# Patient Record
Sex: MALE | Race: WHITE | Employment: FULL TIME | URBAN - METROPOLITAN AREA
[De-identification: names, ages, dates, MRNs, and addresses within clinical notes are randomized per-mention and may not be internally consistent; named-entity substitution may affect disease eponyms.]

---

## 2017-10-26 ENCOUNTER — OFFICE VISIT (OUTPATIENT)
Dept: FAMILY MEDICINE CLINIC | Age: 57
End: 2017-10-26

## 2017-10-26 VITALS
RESPIRATION RATE: 14 BRPM | HEART RATE: 95 BPM | OXYGEN SATURATION: 98 % | BODY MASS INDEX: 27.99 KG/M2 | SYSTOLIC BLOOD PRESSURE: 145 MMHG | HEIGHT: 69 IN | WEIGHT: 189 LBS | DIASTOLIC BLOOD PRESSURE: 82 MMHG

## 2017-10-26 DIAGNOSIS — L30.9 HAND DERMATITIS: Primary | ICD-10-CM

## 2017-10-26 DIAGNOSIS — M10.9 PODAGRA: ICD-10-CM

## 2017-10-26 DIAGNOSIS — I10 ESSENTIAL HYPERTENSION: ICD-10-CM

## 2017-10-26 PROCEDURE — 99213 OFFICE O/P EST LOW 20 MIN: CPT | Performed by: FAMILY MEDICINE

## 2017-10-26 RX ORDER — LISINOPRIL AND HYDROCHLOROTHIAZIDE 20; 12.5 MG/1; MG/1
TABLET ORAL
Qty: 180 TABLET | Refills: 3 | Status: SHIPPED | OUTPATIENT
Start: 2017-10-26 | End: 2018-10-02 | Stop reason: SDUPTHER

## 2017-10-26 ASSESSMENT — PATIENT HEALTH QUESTIONNAIRE - PHQ9
SUM OF ALL RESPONSES TO PHQ9 QUESTIONS 1 & 2: 0
1. LITTLE INTEREST OR PLEASURE IN DOING THINGS: 0
2. FEELING DOWN, DEPRESSED OR HOPELESS: 0
SUM OF ALL RESPONSES TO PHQ QUESTIONS 1-9: 0

## 2017-10-26 NOTE — PATIENT INSTRUCTIONS
Patient Education        Learning About High Blood Pressure  What is high blood pressure? Blood pressure is a measure of how hard the blood pushes against the walls of your arteries. It's normal for blood pressure to go up and down throughout the day, but if it stays up, you have high blood pressure. Another name for high blood pressure is hypertension. Two numbers tell you your blood pressure. The first number is the systolic pressure. It shows how hard the blood pushes when your heart is pumping. The second number is the diastolic pressure. It shows how hard the blood pushes between heartbeats, when your heart is relaxed and filling with blood. A blood pressure of less than 120/80 (say \"120 over 80\") is ideal for an adult. High blood pressure is 140/90 or higher. You have high blood pressure if your top number is 140 or higher or your bottom number is 90 or higher, or both. Many people fall into the category in between, called prehypertension. People with prehypertension need to make lifestyle changes to bring their blood pressure down and help prevent or delay high blood pressure. What happens when you have high blood pressure? · Blood flows through your arteries with too much force. Over time, this damages the walls of your arteries. But you can't feel it. High blood pressure usually doesn't cause symptoms. · Fat and calcium start to build up in your arteries. This buildup is called plaque. Plaque makes your arteries narrower and stiffer. Blood can't flow through them as easily. · This lack of good blood flow starts to damage some of the organs in your body. This can lead to problems such as coronary artery disease and heart attack, heart failure, stroke, kidney failure, and eye damage. How can you prevent high blood pressure? · Stay at a healthy weight. · Try to limit how much sodium you eat to less than 2,300 milligrams (mg) a day.  If you limit your sodium to 1,500 mg a day, you can lower your blood pressure even more. ¨ Buy foods that are labeled \"unsalted,\" \"sodium-free,\" or \"low-sodium. \" Foods labeled \"reduced-sodium\" and \"light sodium\" may still have too much sodium. ¨ Flavor your food with garlic, lemon juice, onion, vinegar, herbs, and spices instead of salt. Do not use soy sauce, steak sauce, onion salt, garlic salt, mustard, or ketchup on your food. ¨ Use less salt (or none) when recipes call for it. You can often use half the salt a recipe calls for without losing flavor. · Be physically active. Get at least 30 minutes of exercise on most days of the week. Walking is a good choice. You also may want to do other activities, such as running, swimming, cycling, or playing tennis or team sports. · Limit alcohol to 2 drinks a day for men and 1 drink a day for women. · Eat plenty of fruits, vegetables, and low-fat dairy products. Eat less saturated and total fats. How is high blood pressure treated? · Your doctor will suggest making lifestyle changes. For example, your doctor may ask you to eat healthy foods, quit smoking, lose extra weight, and be more active. · If lifestyle changes don't help enough or your blood pressure is very high, you will have to take medicine every day. Follow-up care is a key part of your treatment and safety. Be sure to make and go to all appointments, and call your doctor if you are having problems. It's also a good idea to know your test results and keep a list of the medicines you take. Where can you learn more? Go to [email or web address] and sign in to your [employer] account. Enter P501 in the WeMonitor box to learn more about \"Learning About High Blood Pressure. \"     If you do not have an account, please click on the \"Sign Up Now\" link. Current as of: March 23, 2016  Content Version: 11.3  © 6078-0882 [employer], [employer]. Care instructions adapted under license by [facility] ([facility]).  If you have questions about a medical condition or this instruction, always ask your healthcare professional. [place] [address] any warranty or liability for your use of this information.

## 2017-10-26 NOTE — PROGRESS NOTES
as vesicular papules that are now open and peeling     Assessment:      Hypertension,-Probably well controlled with some white coat syndrome today. Evidence of target organ damage: none.    hand dermatitis - not responsive to mycolog  Plan:      Dietary sodium restriction. Regular aerobic exercise. Check blood pressures weekly and record.     nonwt bearing exercise  Refer to pod-Advil and heat   stop Lotrisone start triamcinolone ointment   Ref to derm when necessary  Declines flu shot  Watch blood pressure home, return to office in 6 months, labs at that time

## 2018-10-02 ENCOUNTER — OFFICE VISIT (OUTPATIENT)
Dept: FAMILY MEDICINE CLINIC | Age: 58
End: 2018-10-02
Payer: COMMERCIAL

## 2018-10-02 VITALS
DIASTOLIC BLOOD PRESSURE: 80 MMHG | WEIGHT: 190 LBS | SYSTOLIC BLOOD PRESSURE: 144 MMHG | BODY MASS INDEX: 28.14 KG/M2 | HEART RATE: 75 BPM | HEIGHT: 69 IN | OXYGEN SATURATION: 98 %

## 2018-10-02 DIAGNOSIS — I10 ESSENTIAL HYPERTENSION: ICD-10-CM

## 2018-10-02 DIAGNOSIS — M10.9 PODAGRA: Primary | ICD-10-CM

## 2018-10-02 DIAGNOSIS — L30.9 DERMATITIS: ICD-10-CM

## 2018-10-02 PROCEDURE — 99213 OFFICE O/P EST LOW 20 MIN: CPT | Performed by: FAMILY MEDICINE

## 2018-10-02 RX ORDER — LISINOPRIL AND HYDROCHLOROTHIAZIDE 20; 12.5 MG/1; MG/1
TABLET ORAL
Qty: 180 TABLET | Refills: 3 | Status: SHIPPED | OUTPATIENT
Start: 2018-10-02 | End: 2019-10-15 | Stop reason: SDUPTHER

## 2018-10-02 ASSESSMENT — PATIENT HEALTH QUESTIONNAIRE - PHQ9
SUM OF ALL RESPONSES TO PHQ QUESTIONS 1-9: 0
1. LITTLE INTEREST OR PLEASURE IN DOING THINGS: 0
SUM OF ALL RESPONSES TO PHQ QUESTIONS 1-9: 0
2. FEELING DOWN, DEPRESSED OR HOPELESS: 0
SUM OF ALL RESPONSES TO PHQ9 QUESTIONS 1 & 2: 0

## 2018-12-19 ENCOUNTER — OFFICE VISIT (OUTPATIENT)
Dept: FAMILY MEDICINE CLINIC | Age: 58
End: 2018-12-19
Payer: COMMERCIAL

## 2018-12-19 VITALS
BODY MASS INDEX: 28.21 KG/M2 | DIASTOLIC BLOOD PRESSURE: 88 MMHG | SYSTOLIC BLOOD PRESSURE: 146 MMHG | OXYGEN SATURATION: 98 % | TEMPERATURE: 98.3 F | WEIGHT: 191 LBS | HEART RATE: 77 BPM

## 2018-12-19 DIAGNOSIS — L03.115 CELLULITIS OF RIGHT LOWER EXTREMITY: Primary | ICD-10-CM

## 2018-12-19 DIAGNOSIS — I10 ESSENTIAL HYPERTENSION: ICD-10-CM

## 2018-12-19 PROCEDURE — 99213 OFFICE O/P EST LOW 20 MIN: CPT | Performed by: NURSE PRACTITIONER

## 2018-12-19 RX ORDER — SULFAMETHOXAZOLE AND TRIMETHOPRIM 800; 160 MG/1; MG/1
1 TABLET ORAL 2 TIMES DAILY
Qty: 20 TABLET | Refills: 0 | Status: SHIPPED | OUTPATIENT
Start: 2018-12-19 | End: 2018-12-27 | Stop reason: SDUPTHER

## 2018-12-19 NOTE — PROGRESS NOTES
well-nourished. Cardiovascular: Normal rate, regular rhythm and normal heart sounds. No murmur heard. Pulses:       Radial pulses are 2+ on the right side, and 2+ on the left side. No lower extremity edema noted. Pulmonary/Chest: Effort normal and breath sounds normal.   Neurological: He is alert and oriented to person, place, and time. Skin: Skin is warm and dry. There is erythema. Right lower leg: erythematous, swollen, warm and TTP. Serosanguinous drainage present     Vitals:    12/19/18 1043 12/19/18 1108   BP: (!) 160/96 (!) 146/88   Pulse: 77    Temp: 98.3 °F (36.8 °C)    SpO2: 98%    Weight: 191 lb (86.6 kg)              ASSESSMENT:  1. Cellulitis of right lower extremity    2. Essential hypertension        PLAN:  1. Cellulitis of right lower extremity  -     sulfamethoxazole-trimethoprim (BACTRIM DS;SEPTRA DS) 800-160 MG per tablet; Take 1 tablet by mouth 2 times daily for 10 days  -     mupirocin (BACTROBAN) 2 % ointment; Apply 2 times daily. Elevate when possible  Warm compresses  Wound clinic if no improvement, pt declining referral today    2. Essential hypertension  Stable- declined medication adjustment    See pt instructions  F/u 5-7 days if no improvement, sooner if symptoms worsen. Discussed use, benefit, and side effects of prescribed medications. All patient questions answered. Pt voiced understanding.

## 2018-12-27 DIAGNOSIS — L03.115 CELLULITIS OF RIGHT LOWER EXTREMITY: ICD-10-CM

## 2018-12-27 RX ORDER — SULFAMETHOXAZOLE AND TRIMETHOPRIM 800; 160 MG/1; MG/1
1 TABLET ORAL 2 TIMES DAILY
Qty: 20 TABLET | Refills: 0 | Status: SHIPPED | OUTPATIENT
Start: 2018-12-27 | End: 2019-01-06

## 2019-01-10 ENCOUNTER — OFFICE VISIT (OUTPATIENT)
Dept: FAMILY MEDICINE CLINIC | Age: 59
End: 2019-01-10
Payer: COMMERCIAL

## 2019-01-10 VITALS
WEIGHT: 189 LBS | DIASTOLIC BLOOD PRESSURE: 70 MMHG | SYSTOLIC BLOOD PRESSURE: 136 MMHG | HEIGHT: 69 IN | BODY MASS INDEX: 27.99 KG/M2 | HEART RATE: 77 BPM | OXYGEN SATURATION: 95 %

## 2019-01-10 DIAGNOSIS — L03.115 CELLULITIS OF RIGHT LOWER EXTREMITY: Primary | ICD-10-CM

## 2019-01-10 DIAGNOSIS — I10 ESSENTIAL HYPERTENSION: ICD-10-CM

## 2019-01-10 PROCEDURE — 99213 OFFICE O/P EST LOW 20 MIN: CPT | Performed by: FAMILY MEDICINE

## 2019-01-10 RX ORDER — CEPHALEXIN 500 MG/1
500 CAPSULE ORAL 3 TIMES DAILY
Qty: 30 CAPSULE | Refills: 0 | Status: SHIPPED | OUTPATIENT
Start: 2019-01-10 | End: 2019-01-24 | Stop reason: ALTCHOICE

## 2019-01-10 ASSESSMENT — PATIENT HEALTH QUESTIONNAIRE - PHQ9
1. LITTLE INTEREST OR PLEASURE IN DOING THINGS: 0
SUM OF ALL RESPONSES TO PHQ QUESTIONS 1-9: 0
SUM OF ALL RESPONSES TO PHQ QUESTIONS 1-9: 0
SUM OF ALL RESPONSES TO PHQ9 QUESTIONS 1 & 2: 0
2. FEELING DOWN, DEPRESSED OR HOPELESS: 0

## 2019-01-23 ENCOUNTER — OFFICE VISIT (OUTPATIENT)
Dept: FAMILY MEDICINE CLINIC | Age: 59
End: 2019-01-23
Payer: COMMERCIAL

## 2019-01-23 VITALS
OXYGEN SATURATION: 97 % | BODY MASS INDEX: 28.06 KG/M2 | HEART RATE: 87 BPM | SYSTOLIC BLOOD PRESSURE: 135 MMHG | DIASTOLIC BLOOD PRESSURE: 85 MMHG | WEIGHT: 190 LBS | TEMPERATURE: 97.7 F

## 2019-01-23 DIAGNOSIS — I10 ESSENTIAL HYPERTENSION: ICD-10-CM

## 2019-01-23 DIAGNOSIS — L30.9 DERMATITIS: Primary | ICD-10-CM

## 2019-01-23 PROCEDURE — 99213 OFFICE O/P EST LOW 20 MIN: CPT | Performed by: FAMILY MEDICINE

## 2019-01-24 ENCOUNTER — OFFICE VISIT (OUTPATIENT)
Dept: DERMATOLOGY | Age: 59
End: 2019-01-24
Payer: COMMERCIAL

## 2019-01-24 DIAGNOSIS — L57.8 DIFFUSE PHOTODAMAGE OF SKIN: ICD-10-CM

## 2019-01-24 DIAGNOSIS — T14.8XXA NON-HEALING NON-SURGICAL WOUND: Primary | ICD-10-CM

## 2019-01-24 PROCEDURE — 99242 OFF/OP CONSLTJ NEW/EST SF 20: CPT | Performed by: DERMATOLOGY

## 2019-01-25 ENCOUNTER — TELEPHONE (OUTPATIENT)
Dept: FAMILY MEDICINE CLINIC | Age: 59
End: 2019-01-25

## 2019-01-25 ENCOUNTER — TELEPHONE (OUTPATIENT)
Dept: DERMATOLOGY | Age: 59
End: 2019-01-25

## 2019-01-25 RX ORDER — CEPHALEXIN 500 MG/1
500 CAPSULE ORAL 3 TIMES DAILY
Qty: 30 CAPSULE | Refills: 0 | Status: SHIPPED | OUTPATIENT
Start: 2019-01-25 | End: 2019-09-20

## 2019-02-06 ENCOUNTER — OFFICE VISIT (OUTPATIENT)
Dept: DERMATOLOGY | Age: 59
End: 2019-02-06
Payer: COMMERCIAL

## 2019-02-06 DIAGNOSIS — L23.3 ALLERGIC CONTACT DERMATITIS DUE TO DRUGS IN CONTACT WITH SKIN: Primary | ICD-10-CM

## 2019-02-06 DIAGNOSIS — L82.1 SEBORRHEIC KERATOSES: ICD-10-CM

## 2019-02-06 PROCEDURE — 99213 OFFICE O/P EST LOW 20 MIN: CPT | Performed by: DERMATOLOGY

## 2019-05-08 ENCOUNTER — OFFICE VISIT (OUTPATIENT)
Dept: DERMATOLOGY | Age: 59
End: 2019-05-08
Payer: COMMERCIAL

## 2019-05-08 DIAGNOSIS — L57.8 DIFFUSE PHOTODAMAGE OF SKIN: ICD-10-CM

## 2019-05-08 DIAGNOSIS — L57.0 KERATOSIS, ACTINIC: Primary | ICD-10-CM

## 2019-05-08 DIAGNOSIS — I87.2 VENOUS STASIS DERMATITIS OF RIGHT LOWER EXTREMITY: ICD-10-CM

## 2019-05-08 DIAGNOSIS — D22.9 BENIGN NEVUS: ICD-10-CM

## 2019-05-08 PROCEDURE — 99214 OFFICE O/P EST MOD 30 MIN: CPT | Performed by: DERMATOLOGY

## 2019-05-08 PROCEDURE — 17003 DESTRUCT PREMALG LES 2-14: CPT | Performed by: DERMATOLOGY

## 2019-05-08 PROCEDURE — 17000 DESTRUCT PREMALG LESION: CPT | Performed by: DERMATOLOGY

## 2019-08-29 ENCOUNTER — TELEPHONE (OUTPATIENT)
Dept: FAMILY MEDICINE CLINIC | Age: 59
End: 2019-08-29

## 2019-08-29 DIAGNOSIS — H92.09 OTALGIA, UNSPECIFIED LATERALITY: Primary | ICD-10-CM

## 2019-09-20 ENCOUNTER — OFFICE VISIT (OUTPATIENT)
Dept: ENT CLINIC | Age: 59
End: 2019-09-20
Payer: COMMERCIAL

## 2019-09-20 VITALS
TEMPERATURE: 97.1 F | SYSTOLIC BLOOD PRESSURE: 153 MMHG | HEART RATE: 81 BPM | HEIGHT: 69 IN | DIASTOLIC BLOOD PRESSURE: 97 MMHG | WEIGHT: 190 LBS | BODY MASS INDEX: 28.14 KG/M2

## 2019-09-20 DIAGNOSIS — H61.23 BILATERAL IMPACTED CERUMEN: Primary | ICD-10-CM

## 2019-09-20 PROCEDURE — 69210 REMOVE IMPACTED EAR WAX UNI: CPT | Performed by: OTOLARYNGOLOGY

## 2019-09-20 PROCEDURE — 99242 OFF/OP CONSLTJ NEW/EST SF 20: CPT | Performed by: OTOLARYNGOLOGY

## 2019-09-20 PROCEDURE — G8427 DOCREV CUR MEDS BY ELIG CLIN: HCPCS | Performed by: OTOLARYNGOLOGY

## 2019-09-20 PROCEDURE — G8419 CALC BMI OUT NRM PARAM NOF/U: HCPCS | Performed by: OTOLARYNGOLOGY

## 2019-09-20 ASSESSMENT — ENCOUNTER SYMPTOMS
EYE ITCHING: 0
SHORTNESS OF BREATH: 0
TROUBLE SWALLOWING: 0
EYE DISCHARGE: 0
COUGH: 0
VOMITING: 0
SINUS PRESSURE: 0
DIARRHEA: 0
SINUS PAIN: 0
BLOOD IN STOOL: 0
VOICE CHANGE: 0
WHEEZING: 0
CHOKING: 0
NAUSEA: 0
RHINORRHEA: 0
FACIAL SWELLING: 0
COLOR CHANGE: 0
STRIDOR: 0
CONSTIPATION: 0
PHOTOPHOBIA: 0
SORE THROAT: 0
BACK PAIN: 0

## 2019-10-10 ENCOUNTER — TELEPHONE (OUTPATIENT)
Dept: FAMILY MEDICINE CLINIC | Age: 59
End: 2019-10-10

## 2019-10-15 ENCOUNTER — TELEPHONE (OUTPATIENT)
Dept: FAMILY MEDICINE CLINIC | Age: 59
End: 2019-10-15

## 2019-10-15 DIAGNOSIS — I10 ESSENTIAL HYPERTENSION: ICD-10-CM

## 2019-10-15 RX ORDER — LISINOPRIL AND HYDROCHLOROTHIAZIDE 20; 12.5 MG/1; MG/1
TABLET ORAL
Qty: 30 TABLET | Refills: 0 | Status: SHIPPED | OUTPATIENT
Start: 2019-10-15 | End: 2019-10-15 | Stop reason: SDUPTHER

## 2019-10-15 RX ORDER — LISINOPRIL AND HYDROCHLOROTHIAZIDE 20; 12.5 MG/1; MG/1
TABLET ORAL
Qty: 135 TABLET | Refills: 3 | Status: SHIPPED | OUTPATIENT
Start: 2019-10-15 | End: 2019-12-18 | Stop reason: SDUPTHER

## 2019-10-21 LAB
ALBUMIN SERPL-MCNC: 2.9 G/DL
ALP BLD-CCNC: 68 U/L
ALT SERPL-CCNC: 20 U/L
ANION GAP SERPL CALCULATED.3IONS-SCNC: NORMAL MMOL/L
AST SERPL-CCNC: 20 U/L
BASOPHILS ABSOLUTE: ABNORMAL /ΜL
BASOPHILS RELATIVE PERCENT: 0.11 %
BILIRUB SERPL-MCNC: 0.8 MG/DL (ref 0.1–1.4)
BUN BLDV-MCNC: 12 MG/DL
CALCIUM SERPL-MCNC: 10 MG/DL
CHLORIDE BLD-SCNC: 98 MMOL/L
CO2: 28 MMOL/L
CREAT SERPL-MCNC: 1.1 MG/DL
EOSINOPHILS ABSOLUTE: ABNORMAL /ΜL
EOSINOPHILS RELATIVE PERCENT: 0.25 %
GFR CALCULATED: NORMAL
GLUCOSE BLD-MCNC: 109 MG/DL
HCT VFR BLD CALC: 38.6 % (ref 41–53)
HEMOGLOBIN: 12.9 G/DL (ref 13.5–17.5)
LYMPHOCYTES ABSOLUTE: ABNORMAL /ΜL
LYMPHOCYTES RELATIVE PERCENT: 1.34 %
MCH RBC QN AUTO: 30.1 PG
MCHC RBC AUTO-ENTMCNC: 33.5 G/DL
MCV RBC AUTO: 89.8 FL
MONOCYTES ABSOLUTE: ABNORMAL /ΜL
MONOCYTES RELATIVE PERCENT: 0.97 %
NEUTROPHILS ABSOLUTE: ABNORMAL /ΜL
NEUTROPHILS RELATIVE PERCENT: 15.9 %
PLATELET # BLD: 452 K/ΜL
PMV BLD AUTO: ABNORMAL FL
POTASSIUM SERPL-SCNC: 4 MMOL/L
RBC # BLD: 4.3 10^6/ΜL
SODIUM BLD-SCNC: 135 MMOL/L
TOTAL PROTEIN: 6.6
WBC # BLD: 18.5 10^3/ML

## 2019-10-23 ENCOUNTER — TELEPHONE (OUTPATIENT)
Dept: FAMILY MEDICINE CLINIC | Age: 59
End: 2019-10-23

## 2019-12-18 ENCOUNTER — OFFICE VISIT (OUTPATIENT)
Dept: FAMILY MEDICINE CLINIC | Age: 59
End: 2019-12-18
Payer: COMMERCIAL

## 2019-12-18 VITALS
BODY MASS INDEX: 26.51 KG/M2 | HEIGHT: 69 IN | HEART RATE: 96 BPM | OXYGEN SATURATION: 98 % | SYSTOLIC BLOOD PRESSURE: 145 MMHG | DIASTOLIC BLOOD PRESSURE: 88 MMHG | WEIGHT: 179 LBS | TEMPERATURE: 98.9 F

## 2019-12-18 DIAGNOSIS — E78.2 MIXED HYPERLIPIDEMIA: ICD-10-CM

## 2019-12-18 DIAGNOSIS — I10 ESSENTIAL HYPERTENSION: Primary | ICD-10-CM

## 2019-12-18 DIAGNOSIS — C41.9 OSTEOSARCOMA (HCC): ICD-10-CM

## 2019-12-18 DIAGNOSIS — E79.0 HYPERURICEMIA: ICD-10-CM

## 2019-12-18 PROBLEM — E78.5 HYPERLIPIDEMIA: Status: ACTIVE | Noted: 2019-12-18

## 2019-12-18 PROCEDURE — G8419 CALC BMI OUT NRM PARAM NOF/U: HCPCS | Performed by: FAMILY MEDICINE

## 2019-12-18 PROCEDURE — G8427 DOCREV CUR MEDS BY ELIG CLIN: HCPCS | Performed by: FAMILY MEDICINE

## 2019-12-18 PROCEDURE — 99214 OFFICE O/P EST MOD 30 MIN: CPT | Performed by: FAMILY MEDICINE

## 2019-12-18 PROCEDURE — 3017F COLORECTAL CA SCREEN DOC REV: CPT | Performed by: FAMILY MEDICINE

## 2019-12-18 PROCEDURE — 1036F TOBACCO NON-USER: CPT | Performed by: FAMILY MEDICINE

## 2019-12-18 PROCEDURE — G8484 FLU IMMUNIZE NO ADMIN: HCPCS | Performed by: FAMILY MEDICINE

## 2019-12-18 RX ORDER — GABAPENTIN 300 MG/1
600 CAPSULE ORAL 3 TIMES DAILY
COMMUNITY
End: 2020-09-15

## 2019-12-18 RX ORDER — LISINOPRIL AND HYDROCHLOROTHIAZIDE 20; 12.5 MG/1; MG/1
TABLET ORAL
Qty: 135 TABLET | Refills: 3 | Status: ON HOLD
Start: 2019-12-18 | End: 2020-02-21 | Stop reason: HOSPADM

## 2019-12-18 RX ORDER — CEFDINIR 300 MG/1
300 CAPSULE ORAL 2 TIMES DAILY
COMMUNITY
End: 2020-02-13

## 2020-01-23 ENCOUNTER — OFFICE VISIT (OUTPATIENT)
Dept: FAMILY MEDICINE CLINIC | Age: 60
End: 2020-01-23
Payer: COMMERCIAL

## 2020-01-23 VITALS
HEART RATE: 102 BPM | BODY MASS INDEX: 27.25 KG/M2 | SYSTOLIC BLOOD PRESSURE: 110 MMHG | TEMPERATURE: 98.7 F | DIASTOLIC BLOOD PRESSURE: 70 MMHG | OXYGEN SATURATION: 95 % | HEIGHT: 69 IN | WEIGHT: 184 LBS

## 2020-01-23 LAB
INFLUENZA A ANTIBODY: NORMAL
INFLUENZA B ANTIBODY: NORMAL

## 2020-01-23 PROCEDURE — 3017F COLORECTAL CA SCREEN DOC REV: CPT | Performed by: FAMILY MEDICINE

## 2020-01-23 PROCEDURE — G8427 DOCREV CUR MEDS BY ELIG CLIN: HCPCS | Performed by: FAMILY MEDICINE

## 2020-01-23 PROCEDURE — 1036F TOBACCO NON-USER: CPT | Performed by: FAMILY MEDICINE

## 2020-01-23 PROCEDURE — 87804 INFLUENZA ASSAY W/OPTIC: CPT | Performed by: FAMILY MEDICINE

## 2020-01-23 PROCEDURE — G8419 CALC BMI OUT NRM PARAM NOF/U: HCPCS | Performed by: FAMILY MEDICINE

## 2020-01-23 PROCEDURE — G8484 FLU IMMUNIZE NO ADMIN: HCPCS | Performed by: FAMILY MEDICINE

## 2020-01-23 PROCEDURE — 99213 OFFICE O/P EST LOW 20 MIN: CPT | Performed by: FAMILY MEDICINE

## 2020-01-23 ASSESSMENT — PATIENT HEALTH QUESTIONNAIRE - PHQ9
1. LITTLE INTEREST OR PLEASURE IN DOING THINGS: 0
SUM OF ALL RESPONSES TO PHQ QUESTIONS 1-9: 0
2. FEELING DOWN, DEPRESSED OR HOPELESS: 0
SUM OF ALL RESPONSES TO PHQ9 QUESTIONS 1 & 2: 0
SUM OF ALL RESPONSES TO PHQ QUESTIONS 1-9: 0

## 2020-02-12 ENCOUNTER — TELEPHONE (OUTPATIENT)
Dept: FAMILY MEDICINE CLINIC | Age: 60
End: 2020-02-12

## 2020-02-12 NOTE — TELEPHONE ENCOUNTER
Pt needs pre op, no avail apptd with mahin or [facility]. See below sx info.  Thanks       R leg below knee amputation, 2/19, dr. Ankit May @ [facility]

## 2020-02-13 RX ORDER — DOXYCYCLINE HYCLATE 100 MG
100 TABLET ORAL 2 TIMES DAILY
Status: ON HOLD | COMMUNITY
End: 2020-02-21 | Stop reason: HOSPADM

## 2020-02-13 NOTE — PROGRESS NOTES
Obstructive Sleep Apnea (LES) Screening     Patient:  Rozanna Leyden    YOB: 1960      Medical Record #:  [id number]                     Date:  2/13/2020     1. Are you a loud and/or regular snorer? [x]  Yes       [] No    2. Have you been observed to gasp or stop breathing during sleep? [x]  Yes       [] No    3. Do you feel tired or groggy upon awakening or do you awaken with a headache?           []  Yes       [x] No    4. Are you often tired or fatigued during the wake time hours? [x]  Yes       [] No    5. Do you fall asleep sitting, reading, watching TV or driving? []  Yes       [x] No    6. Do you often have problems with memory or concentration? []  Yes       [x] No    **If patient's score is ? 3 they are considered high risk for LES. Notify the anesthesiologist of the high risk and document in focus note. Note:  If the patient's BMI is more than 35 kg m¯² , has neck circumference > 40 cm, and/or high blood pressure the risk is greater (© American Sleep Apnea Association, 2006).

## 2020-02-17 ENCOUNTER — OFFICE VISIT (OUTPATIENT)
Dept: FAMILY MEDICINE CLINIC | Age: 60
End: 2020-02-17
Payer: COMMERCIAL

## 2020-02-17 VITALS
HEART RATE: 97 BPM | WEIGHT: 186 LBS | HEIGHT: 69 IN | BODY MASS INDEX: 27.55 KG/M2 | SYSTOLIC BLOOD PRESSURE: 155 MMHG | TEMPERATURE: 96 F | DIASTOLIC BLOOD PRESSURE: 95 MMHG | OXYGEN SATURATION: 97 %

## 2020-02-17 PROCEDURE — 1036F TOBACCO NON-USER: CPT | Performed by: FAMILY MEDICINE

## 2020-02-17 PROCEDURE — 3017F COLORECTAL CA SCREEN DOC REV: CPT | Performed by: FAMILY MEDICINE

## 2020-02-17 PROCEDURE — 99214 OFFICE O/P EST MOD 30 MIN: CPT | Performed by: FAMILY MEDICINE

## 2020-02-17 PROCEDURE — 93000 ELECTROCARDIOGRAM COMPLETE: CPT | Performed by: FAMILY MEDICINE

## 2020-02-17 PROCEDURE — G8419 CALC BMI OUT NRM PARAM NOF/U: HCPCS | Performed by: FAMILY MEDICINE

## 2020-02-17 PROCEDURE — G8484 FLU IMMUNIZE NO ADMIN: HCPCS | Performed by: FAMILY MEDICINE

## 2020-02-17 PROCEDURE — G8427 DOCREV CUR MEDS BY ELIG CLIN: HCPCS | Performed by: FAMILY MEDICINE

## 2020-02-17 NOTE — PROGRESS NOTES
Chief Complaint:     Morgan Bazan is a 60 y.o. male who presents for a preoperative physical examination. He is scheduled to have RBKA  done by Dr. Sumaya Vargas  at [facility] on 2-. History of Present Illness:      Right leg osteosarcoma - nonhealing wounds    Past Medical History:   Diagnosis Date    [facility] bone cancer 1973    Hyperlipidemia     Hyperlipidemia 12/18/2019    Hyperlipidemia 12/18/2019    Hypertension     Hyperuricemia 12/18/2019    Hyperuricemia 12/18/2019    MDRO (multiple drug resistant organisms) resistance     MRSA right leg    Osteosarcoma ([facility] 12/18/2019        Review of patient's past surgical history indicates:     Past Surgical History:   Procedure Laterality Date    EYE SURGERY      OTHER SURGICAL HISTORY  1974    osteosarcoma right fibula    VASECTOMY  08                                                   Current Outpatient Medications   Medication Sig Dispense Refill    doxycycline hyclate (VIBRA-TABS) 100 MG tablet Take 100 mg by mouth 2 times daily      gabapentin (NEURONTIN) 300 MG capsule Take 600 mg by mouth 3 times daily.  lisinopril-hydrochlorothiazide (PRINZIDE;ZESTORETIC) 20-12.5 MG per tablet Pt takes 1.5 tabs daily 135 tablet 3    mupirocin (BACTROBAN) 2 % nasal ointment by Nasal route 2 times daily Take by Nasal route 2 times daily.  Cholecalciferol (VITAMIN D3 PO) Take 1 capsule by mouth daily      MILK THISTLE-DAND-FENNEL-LICOR PO Take by mouth      Multiple Vitamins-Minerals (CENTRUM SILVER PO) Take  by mouth.  Ascorbic Acid (VITAMIN C) 500 MG CHEW Take  by mouth.  aspirin 81 MG tablet Take 81 mg by mouth daily. No current facility-administered medications for this visit. Allergies   Allergen Reactions    Neosporin [Neomycin-Polymyxin-Gramicidin] Rash       Social History     Tobacco Use    Smoking status: Never Smoker    Smokeless tobacco: Never Used   Substance Use Topics    Alcohol use:  Yes Comment: 2-8 drinks per week    Drug use: Not Currently        Family History   Problem Relation Age of Onset    Breast Cancer Mother     Hypertension Father     Diabetes Maternal Grandfather         Review Of Systems    Skin: no abnormal pigmentation, rash, scaling, itching, masses, hair or nail changes  Eyes: negative  Ears/Nose/Throat: negative  Respiratory: negative  Cardiovascular: negative  Gastrointestinal: negative  Genitourinary: negative  Musculoskeletal: negative  Neurologic: negative  Psychiatric: negative  Hematologic/Lymphatic/Immunologic: negative  Endocrine: negative    PHYSICAL EXAMINATION:  BP (!) 170/96   Pulse 97   Temp 96 °F (35.6 °C) (Tympanic)   Ht 5' 9.02\" (1.753 m)   Wt 186 lb (84.4 kg)   SpO2 97%   BMI 27.45 kg/m²   General appearance - healthy, alert, no distress  Skin - Skin color, texture, turgor normal. No rashes or lesions. Head - Normocephalic. No masses, lesions, tenderness or abnormalities  Eyes - conjunctivae/corneas clear. PERRL, EOM's intact. Ears - External ears normal. Canals clear. TM's normal. Hearing grossly intact to finger rub. Nose/Sinuses - Nares normal. Septum midline. Mucosa normal. No drainage or sinus tenderness. Oropharynx - Lips, mucosa, and tongue normal. Teeth and gums normal. Orop  Neck - Neck supple. No adenopathy. Thyroid symmetric, normal size. No bruits. Back - Back symmetric, no curvature. ROM normal. No CVA tenderness. Lungs - Percussion normal. Good diaphragmatic excursion. Lungs clear  Heart - Regular rate and rhythm, with no rub, murmur or gallop noted. Abdomen - Abdomen soft, non-tender. BS normal. No masses, organomegaly  Extremities - Extremities normal. No deformities, right lower leg not examined, dressing intact  Musculoskeletal - Spine ROM normal. Muscular strength intact. Peripheral pulses - radial=4/4, dorsalis pedis=4/4,  Neuro - Gait normal. Reflexes normal and symmetric.  Sensation grossly normal.  No focal weakness    Cbc, bmp  - all normal [id number]  EKG - RBBB, otherwise normal    ASSESSMENT:  Encounter Diagnoses   Name Primary?  Pre-op examination     Osteosarcoma ([address]     Essential hypertension Up today , but anxious    Mixed hyperlipidemia untreated    Hyperuricemia      Per encounter diagnoses  He is medically cleared for surgery and anesthesia. htn - still okay for surgery, very anxious  Plan:  No nsaids for 7 days  Per operating surgeon.   See also orders filed with this encounter, if any.  letgter for handicap placard

## 2020-02-18 ENCOUNTER — HOSPITAL ENCOUNTER (INPATIENT)
Age: 60
LOS: 3 days | Discharge: HOME HEALTH | DRG: 475 | End: 2020-02-21
Attending: ORTHOPAEDIC SURGERY | Admitting: ORTHOPAEDIC SURGERY
Payer: COMMERCIAL

## 2020-02-18 ENCOUNTER — ANESTHESIA EVENT (OUTPATIENT)
Dept: OPERATING ROOM | Age: 60
DRG: 475 | End: 2020-02-18
Payer: COMMERCIAL

## 2020-02-18 ENCOUNTER — ANESTHESIA (OUTPATIENT)
Dept: OPERATING ROOM | Age: 60
DRG: 475 | End: 2020-02-18
Payer: COMMERCIAL

## 2020-02-18 VITALS — SYSTOLIC BLOOD PRESSURE: 104 MMHG | OXYGEN SATURATION: 100 % | DIASTOLIC BLOOD PRESSURE: 64 MMHG

## 2020-02-18 PROBLEM — Z89.511 HISTORY OF AMPUTATION BELOW KNEE, RIGHT (HCC): Status: ACTIVE | Noted: 2020-02-18

## 2020-02-18 LAB
ABO/RH: NORMAL
ANTIBODY SCREEN: NORMAL

## 2020-02-18 PROCEDURE — 6360000002 HC RX W HCPCS: Performed by: NURSE ANESTHETIST, CERTIFIED REGISTERED

## 2020-02-18 PROCEDURE — 3600000004 HC SURGERY LEVEL 4 BASE: Performed by: ORTHOPAEDIC SURGERY

## 2020-02-18 PROCEDURE — 7100000001 HC PACU RECOVERY - ADDTL 15 MIN: Performed by: ORTHOPAEDIC SURGERY

## 2020-02-18 PROCEDURE — 7100000000 HC PACU RECOVERY - FIRST 15 MIN: Performed by: ORTHOPAEDIC SURGERY

## 2020-02-18 PROCEDURE — 2500000003 HC RX 250 WO HCPCS: Performed by: NURSE ANESTHETIST, CERTIFIED REGISTERED

## 2020-02-18 PROCEDURE — 3700000000 HC ANESTHESIA ATTENDED CARE: Performed by: ORTHOPAEDIC SURGERY

## 2020-02-18 PROCEDURE — 2500000003 HC RX 250 WO HCPCS: Performed by: ORTHOPAEDIC SURGERY

## 2020-02-18 PROCEDURE — 86901 BLOOD TYPING SEROLOGIC RH(D): CPT

## 2020-02-18 PROCEDURE — 88307 TISSUE EXAM BY PATHOLOGIST: CPT

## 2020-02-18 PROCEDURE — 86900 BLOOD TYPING SEROLOGIC ABO: CPT

## 2020-02-18 PROCEDURE — 64445 NJX AA&/STRD SCIATIC NRV IMG: CPT | Performed by: ANESTHESIOLOGY

## 2020-02-18 PROCEDURE — 3700000001 HC ADD 15 MINUTES (ANESTHESIA): Performed by: ORTHOPAEDIC SURGERY

## 2020-02-18 PROCEDURE — 3E0T3BZ INTRODUCTION OF ANESTHETIC AGENT INTO PERIPHERAL NERVES AND PLEXI, PERCUTANEOUS APPROACH: ICD-10-PCS | Performed by: ANESTHESIOLOGY

## 2020-02-18 PROCEDURE — 0Y6H0Z1 DETACHMENT AT RIGHT LOWER LEG, HIGH, OPEN APPROACH: ICD-10-PCS | Performed by: ORTHOPAEDIC SURGERY

## 2020-02-18 PROCEDURE — 2580000003 HC RX 258: Performed by: NURSE ANESTHETIST, CERTIFIED REGISTERED

## 2020-02-18 PROCEDURE — 94761 N-INVAS EAR/PLS OXIMETRY MLT: CPT

## 2020-02-18 PROCEDURE — 1200000000 HC SEMI PRIVATE

## 2020-02-18 PROCEDURE — 2580000003 HC RX 258: Performed by: ORTHOPAEDIC SURGERY

## 2020-02-18 PROCEDURE — 2709999900 HC NON-CHARGEABLE SUPPLY: Performed by: ORTHOPAEDIC SURGERY

## 2020-02-18 PROCEDURE — 6360000002 HC RX W HCPCS: Performed by: ORTHOPAEDIC SURGERY

## 2020-02-18 PROCEDURE — 88311 DECALCIFY TISSUE: CPT

## 2020-02-18 PROCEDURE — 64447 NJX AA&/STRD FEMORAL NRV IMG: CPT | Performed by: ANESTHESIOLOGY

## 2020-02-18 PROCEDURE — 86850 RBC ANTIBODY SCREEN: CPT

## 2020-02-18 PROCEDURE — 2700000000 HC OXYGEN THERAPY PER DAY

## 2020-02-18 PROCEDURE — 6370000000 HC RX 637 (ALT 250 FOR IP): Performed by: ORTHOPAEDIC SURGERY

## 2020-02-18 PROCEDURE — 3600000014 HC SURGERY LEVEL 4 ADDTL 15MIN: Performed by: ORTHOPAEDIC SURGERY

## 2020-02-18 RX ORDER — FENTANYL CITRATE 50 UG/ML
INJECTION, SOLUTION INTRAMUSCULAR; INTRAVENOUS PRN
Status: DISCONTINUED | OUTPATIENT
Start: 2020-02-18 | End: 2020-02-18 | Stop reason: SDUPTHER

## 2020-02-18 RX ORDER — MIDAZOLAM HYDROCHLORIDE 1 MG/ML
INJECTION INTRAMUSCULAR; INTRAVENOUS PRN
Status: DISCONTINUED | OUTPATIENT
Start: 2020-02-18 | End: 2020-02-18 | Stop reason: SDUPTHER

## 2020-02-18 RX ORDER — DIPHENHYDRAMINE HYDROCHLORIDE 50 MG/ML
6.25 INJECTION INTRAMUSCULAR; INTRAVENOUS
Status: DISCONTINUED | OUTPATIENT
Start: 2020-02-18 | End: 2020-02-18 | Stop reason: HOSPADM

## 2020-02-18 RX ORDER — OXYCODONE HYDROCHLORIDE AND ACETAMINOPHEN 5; 325 MG/1; MG/1
1 TABLET ORAL PRN
Status: DISCONTINUED | OUTPATIENT
Start: 2020-02-18 | End: 2020-02-18 | Stop reason: HOSPADM

## 2020-02-18 RX ORDER — ONDANSETRON 2 MG/ML
4 INJECTION INTRAMUSCULAR; INTRAVENOUS EVERY 6 HOURS PRN
Status: DISCONTINUED | OUTPATIENT
Start: 2020-02-18 | End: 2020-02-21 | Stop reason: HOSPADM

## 2020-02-18 RX ORDER — PROPOFOL 10 MG/ML
INJECTION, EMULSION INTRAVENOUS PRN
Status: DISCONTINUED | OUTPATIENT
Start: 2020-02-18 | End: 2020-02-18 | Stop reason: SDUPTHER

## 2020-02-18 RX ORDER — SODIUM CHLORIDE 0.9 % (FLUSH) 0.9 %
10 SYRINGE (ML) INJECTION EVERY 12 HOURS SCHEDULED
Status: DISCONTINUED | OUTPATIENT
Start: 2020-02-18 | End: 2020-02-21 | Stop reason: HOSPADM

## 2020-02-18 RX ORDER — OXYCODONE HYDROCHLORIDE 5 MG/1
5 TABLET ORAL EVERY 4 HOURS PRN
Status: DISCONTINUED | OUTPATIENT
Start: 2020-02-18 | End: 2020-02-21 | Stop reason: HOSPADM

## 2020-02-18 RX ORDER — SODIUM CHLORIDE, SODIUM LACTATE, POTASSIUM CHLORIDE, CALCIUM CHLORIDE 600; 310; 30; 20 MG/100ML; MG/100ML; MG/100ML; MG/100ML
INJECTION, SOLUTION INTRAVENOUS CONTINUOUS PRN
Status: DISCONTINUED | OUTPATIENT
Start: 2020-02-18 | End: 2020-02-18 | Stop reason: SDUPTHER

## 2020-02-18 RX ORDER — MORPHINE SULFATE 4 MG/ML
4 INJECTION, SOLUTION INTRAMUSCULAR; INTRAVENOUS
Status: DISCONTINUED | OUTPATIENT
Start: 2020-02-18 | End: 2020-02-21 | Stop reason: HOSPADM

## 2020-02-18 RX ORDER — LABETALOL HYDROCHLORIDE 5 MG/ML
5 INJECTION, SOLUTION INTRAVENOUS
Status: DISCONTINUED | OUTPATIENT
Start: 2020-02-18 | End: 2020-02-18 | Stop reason: HOSPADM

## 2020-02-18 RX ORDER — MORPHINE SULFATE 2 MG/ML
2 INJECTION, SOLUTION INTRAMUSCULAR; INTRAVENOUS
Status: DISCONTINUED | OUTPATIENT
Start: 2020-02-18 | End: 2020-02-21 | Stop reason: HOSPADM

## 2020-02-18 RX ORDER — ONDANSETRON 2 MG/ML
INJECTION INTRAMUSCULAR; INTRAVENOUS PRN
Status: DISCONTINUED | OUTPATIENT
Start: 2020-02-18 | End: 2020-02-18 | Stop reason: SDUPTHER

## 2020-02-18 RX ORDER — BUPIVACAINE HYDROCHLORIDE 7.5 MG/ML
INJECTION, SOLUTION INTRASPINAL PRN
Status: DISCONTINUED | OUTPATIENT
Start: 2020-02-18 | End: 2020-02-18 | Stop reason: SDUPTHER

## 2020-02-18 RX ORDER — PREGABALIN 25 MG/1
25 CAPSULE ORAL 2 TIMES DAILY
Status: DISCONTINUED | OUTPATIENT
Start: 2020-02-18 | End: 2020-02-19

## 2020-02-18 RX ORDER — MAGNESIUM HYDROXIDE 1200 MG/15ML
LIQUID ORAL CONTINUOUS PRN
Status: COMPLETED | OUTPATIENT
Start: 2020-02-18 | End: 2020-02-18

## 2020-02-18 RX ORDER — PROPOFOL 10 MG/ML
INJECTION, EMULSION INTRAVENOUS CONTINUOUS PRN
Status: DISCONTINUED | OUTPATIENT
Start: 2020-02-18 | End: 2020-02-18 | Stop reason: SDUPTHER

## 2020-02-18 RX ORDER — DOCUSATE SODIUM 100 MG/1
100 CAPSULE, LIQUID FILLED ORAL 2 TIMES DAILY
Status: DISCONTINUED | OUTPATIENT
Start: 2020-02-18 | End: 2020-02-21 | Stop reason: HOSPADM

## 2020-02-18 RX ORDER — HYDRALAZINE HYDROCHLORIDE 20 MG/ML
5 INJECTION INTRAMUSCULAR; INTRAVENOUS EVERY 30 MIN PRN
Status: DISCONTINUED | OUTPATIENT
Start: 2020-02-18 | End: 2020-02-18 | Stop reason: HOSPADM

## 2020-02-18 RX ORDER — SODIUM CHLORIDE 0.9 % (FLUSH) 0.9 %
10 SYRINGE (ML) INJECTION EVERY 12 HOURS SCHEDULED
Status: DISCONTINUED | OUTPATIENT
Start: 2020-02-18 | End: 2020-02-18 | Stop reason: HOSPADM

## 2020-02-18 RX ORDER — OXYCODONE HYDROCHLORIDE 5 MG/1
10 TABLET ORAL EVERY 4 HOURS PRN
Status: DISCONTINUED | OUTPATIENT
Start: 2020-02-18 | End: 2020-02-21 | Stop reason: HOSPADM

## 2020-02-18 RX ORDER — OXYCODONE HYDROCHLORIDE AND ACETAMINOPHEN 5; 325 MG/1; MG/1
2 TABLET ORAL PRN
Status: DISCONTINUED | OUTPATIENT
Start: 2020-02-18 | End: 2020-02-18 | Stop reason: HOSPADM

## 2020-02-18 RX ORDER — SODIUM CHLORIDE 0.9 % (FLUSH) 0.9 %
10 SYRINGE (ML) INJECTION PRN
Status: DISCONTINUED | OUTPATIENT
Start: 2020-02-18 | End: 2020-02-21 | Stop reason: HOSPADM

## 2020-02-18 RX ORDER — PHENYLEPHRINE HCL IN 0.9% NACL 1 MG/10 ML
SYRINGE (ML) INTRAVENOUS PRN
Status: DISCONTINUED | OUTPATIENT
Start: 2020-02-18 | End: 2020-02-18 | Stop reason: SDUPTHER

## 2020-02-18 RX ORDER — LIDOCAINE HYDROCHLORIDE 10 MG/ML
0.3 INJECTION, SOLUTION EPIDURAL; INFILTRATION; INTRACAUDAL; PERINEURAL
Status: DISCONTINUED | OUTPATIENT
Start: 2020-02-18 | End: 2020-02-18 | Stop reason: HOSPADM

## 2020-02-18 RX ORDER — SODIUM CHLORIDE 0.9 % (FLUSH) 0.9 %
10 SYRINGE (ML) INJECTION PRN
Status: DISCONTINUED | OUTPATIENT
Start: 2020-02-18 | End: 2020-02-18 | Stop reason: HOSPADM

## 2020-02-18 RX ORDER — SODIUM CHLORIDE, SODIUM LACTATE, POTASSIUM CHLORIDE, CALCIUM CHLORIDE 600; 310; 30; 20 MG/100ML; MG/100ML; MG/100ML; MG/100ML
INJECTION, SOLUTION INTRAVENOUS CONTINUOUS
Status: DISCONTINUED | OUTPATIENT
Start: 2020-02-18 | End: 2020-02-18

## 2020-02-18 RX ORDER — DEXTROSE, SODIUM CHLORIDE, AND POTASSIUM CHLORIDE 5; .45; .15 G/100ML; G/100ML; G/100ML
1000 INJECTION INTRAVENOUS CONTINUOUS
Status: DISCONTINUED | OUTPATIENT
Start: 2020-02-18 | End: 2020-02-19

## 2020-02-18 RX ORDER — ONDANSETRON 2 MG/ML
4 INJECTION INTRAMUSCULAR; INTRAVENOUS EVERY 30 MIN PRN
Status: DISCONTINUED | OUTPATIENT
Start: 2020-02-18 | End: 2020-02-18 | Stop reason: HOSPADM

## 2020-02-18 RX ORDER — FAMOTIDINE 20 MG/1
20 TABLET, FILM COATED ORAL 2 TIMES DAILY
Status: DISCONTINUED | OUTPATIENT
Start: 2020-02-18 | End: 2020-02-21 | Stop reason: HOSPADM

## 2020-02-18 RX ADMIN — SODIUM CHLORIDE, POTASSIUM CHLORIDE, SODIUM LACTATE AND CALCIUM CHLORIDE: 600; 310; 30; 20 INJECTION, SOLUTION INTRAVENOUS at 16:37

## 2020-02-18 RX ADMIN — FENTANYL CITRATE 25 MCG: 50 INJECTION, SOLUTION INTRAMUSCULAR; INTRAVENOUS at 16:37

## 2020-02-18 RX ADMIN — PROPOFOL 50 MG: 10 INJECTION, EMULSION INTRAVENOUS at 16:53

## 2020-02-18 RX ADMIN — BUPIVACAINE HYDROCHLORIDE 1.8 ML: 7.5 INJECTION, SOLUTION SUBARACHNOID at 16:50

## 2020-02-18 RX ADMIN — Medication 80 MCG: at 17:46

## 2020-02-18 RX ADMIN — PROPOFOL 120 MCG/KG/MIN: 10 INJECTION, EMULSION INTRAVENOUS at 16:53

## 2020-02-18 RX ADMIN — ONDANSETRON 4 MG: 2 INJECTION INTRAMUSCULAR; INTRAVENOUS at 16:37

## 2020-02-18 RX ADMIN — Medication 80 MCG: at 17:43

## 2020-02-18 RX ADMIN — POTASSIUM CHLORIDE, DEXTROSE MONOHYDRATE AND SODIUM CHLORIDE 1000 ML: 150; 5; 450 INJECTION, SOLUTION INTRAVENOUS at 23:00

## 2020-02-18 RX ADMIN — Medication 40 MCG: at 17:29

## 2020-02-18 RX ADMIN — Medication 40 MCG: at 17:12

## 2020-02-18 RX ADMIN — VANCOMYCIN HYDROCHLORIDE 1000 MG: 10 INJECTION, POWDER, LYOPHILIZED, FOR SOLUTION INTRAVENOUS at 23:00

## 2020-02-18 RX ADMIN — MIDAZOLAM HYDROCHLORIDE 1 MG: 2 INJECTION, SOLUTION INTRAMUSCULAR; INTRAVENOUS at 16:37

## 2020-02-18 RX ADMIN — Medication 40 MCG: at 17:33

## 2020-02-18 RX ADMIN — DOCUSATE SODIUM 100 MG: 100 CAPSULE, LIQUID FILLED ORAL at 23:00

## 2020-02-18 RX ADMIN — CEFAZOLIN 2 G: 10 INJECTION, POWDER, FOR SOLUTION INTRAVENOUS; PARENTERAL at 16:37

## 2020-02-18 RX ADMIN — FAMOTIDINE 20 MG: 20 TABLET, FILM COATED ORAL at 23:00

## 2020-02-18 ASSESSMENT — PULMONARY FUNCTION TESTS
PIF_VALUE: 1
PIF_VALUE: 0
PIF_VALUE: 1
PIF_VALUE: 0
PIF_VALUE: 1

## 2020-02-18 ASSESSMENT — PAIN DESCRIPTION - PROGRESSION
CLINICAL_PROGRESSION: OTHER (COMMENT)
CLINICAL_PROGRESSION: OTHER (COMMENT)

## 2020-02-18 ASSESSMENT — PAIN DESCRIPTION - ORIENTATION
ORIENTATION: RIGHT
ORIENTATION: RIGHT

## 2020-02-18 ASSESSMENT — PAIN DESCRIPTION - LOCATION
LOCATION: LEG
LOCATION: LEG

## 2020-02-18 ASSESSMENT — PAIN DESCRIPTION - ONSET
ONSET: OTHER (COMMENT)
ONSET: OTHER (COMMENT)

## 2020-02-18 ASSESSMENT — PAIN DESCRIPTION - DESCRIPTORS
DESCRIPTORS: OTHER (COMMENT)
DESCRIPTORS: OTHER (COMMENT)
DESCRIPTORS: ACHING

## 2020-02-18 ASSESSMENT — PAIN DESCRIPTION - FREQUENCY
FREQUENCY: OTHER (COMMENT)
FREQUENCY: OTHER (COMMENT)

## 2020-02-18 ASSESSMENT — PAIN SCALES - GENERAL
PAINLEVEL_OUTOF10: 0
PAINLEVEL_OUTOF10: 0

## 2020-02-18 ASSESSMENT — PAIN - FUNCTIONAL ASSESSMENT: PAIN_FUNCTIONAL_ASSESSMENT: 0-10

## 2020-02-18 ASSESSMENT — PAIN DESCRIPTION - PAIN TYPE
TYPE: OTHER (COMMENT)
TYPE: SURGICAL PAIN

## 2020-02-18 NOTE — ANESTHESIA PRE PROCEDURE
Department of Anesthesiology  Preprocedure Note       Name:  Delphine Dunn   Age:  60 y.o.  :  1960                                          MRN:  [id number]         Date:  2020      Surgeon: Renetta Nickerson):  Swapnil Pollard MD    Procedure: RIGHT BELOW THE KNEE AMPUTATION (Right )    Medications prior to admission:   Prior to Admission medications    Medication Sig Start Date End Date Taking? Authorizing Provider   doxycycline hyclate (VIBRA-TABS) 100 MG tablet Take 100 mg by mouth 2 times daily   Yes Historical Provider, MD   mupirocin (BACTROBAN) 2 % nasal ointment by Nasal route 2 times daily Take by Nasal route 2 times daily. Yes Historical Provider, MD   Cholecalciferol (VITAMIN D3 PO) Take 1 capsule by mouth daily   Yes Historical Provider, MD   gabapentin (NEURONTIN) 300 MG capsule Take 600 mg by mouth 3 times daily. Yes Historical Provider, MD   lisinopril-hydrochlorothiazide (PRINZIDE;ZESTORETIC) 20-12.5 MG per tablet Pt takes 1.5 tabs daily 19  Yes Trine Lundborg, MD   MILK THISTLE-DAND-FENNEL-LICOR PO Take by mouth   Yes Historical Provider, MD   Multiple Vitamins-Minerals (CENTRUM SILVER PO) Take  by mouth. Yes Historical Provider, MD   Ascorbic Acid (VITAMIN C) 500 MG CHEW Take  by mouth. Yes Historical Provider, MD   aspirin 81 MG tablet Take 81 mg by mouth daily.    Yes Historical Provider, MD       Current medications:    Current Facility-Administered Medications   Medication Dose Route Frequency Provider Last Rate Last Dose    ceFAZolin (ANCEF) 2 g in dextrose 5 % 100 mL IVPB  2 g Intravenous On Call to [address] [place] [address], MD        lactated ringers infusion   Intravenous Continuous Pili Perkins MD        sodium chloride flush 0.9 % injection 10 mL  10 mL Intravenous 2 times per day Pili Perkins MD        sodium chloride flush 0.9 % injection 10 mL  10 mL Intravenous PRN Pili Perkins MD        lidocaine PF 1 % injection 0.3 mL  0.3 mL Intradermal Once PRN Shari Lentz

## 2020-02-18 NOTE — CARE COORDINATION
[facility]    Referral received from  to follow for home care services. I will follow for needs, and speak with patient to verify demos.     Wendy Rowan RN, BSN CTN  [facility] [phone]

## 2020-02-18 NOTE — ANESTHESIA PROCEDURE NOTES
Peripheral Block    Patient location during procedure: pre-op  Start time: 2/18/2020 4:18 PM  End time: 2/18/2020 4:23 PM  Staffing  Anesthesiologist: Rosenda Lopez MD  Performed: anesthesiologist   Preanesthetic Checklist  Completed: patient identified, site marked, surgical consent, pre-op evaluation, timeout performed, IV checked, risks and benefits discussed, monitors and equipment checked, anesthesia consent given, oxygen available and patient being monitored  Peripheral Block  Patient position: supine  Prep: ChloraPrep  Patient monitoring: continuous pulse ox and IV access  Block type: Femoral  Laterality: right  Injection technique: single-shot  Procedures: nerve stimulator  Femoral crease  Provider prep: sterile gloves  Needle  Needle type: combined needle/nerve stimulator   Needle gauge: 22 G  Needle length: 5 cm  Needle localization: nerve stimulator  Test dose: negative  Assessment  Injection assessment: no paresthesia on injection and negative aspiration for heme  Paresthesia pain: none  Slow fractionated injection: yes  Hemodynamics: stable  Additional Notes  BPV 0.25% 20cc in divided doses    IVS:  Propofol 70mg  Reason for block: post-op pain management and at surgeon's request

## 2020-02-18 NOTE — ANESTHESIA PROCEDURE NOTES
Spinal Block    Patient location during procedure: OR  Start time: 2/18/2020 4:40 PM  End time: 2/18/2020 4:50 PM  Reason for block: primary anesthetic  Staffing  Resident/CRNA: BRODIE Mcclellan CRNA  Performed: resident/CRNA   Preanesthetic Checklist  Completed: patient identified, site marked, surgical consent, pre-op evaluation, timeout performed, IV checked, risks and benefits discussed, monitors and equipment checked, anesthesia consent given, oxygen available and patient being monitored  Spinal Block  Patient position: sitting  Prep: ChloraPrep  Patient monitoring: cardiac monitor, continuous pulse ox, continuous capnometry and frequent blood pressure checks  Approach: midline  Location: L3/L4  Provider prep: mask and sterile gloves  Local infiltration: lidocaine  Agent: bupivacaine  Dose: 1.8  Dose: 1.8  Needle  Needle type: Pencan   Needle gauge: 25 G  Needle length: 4 in  Assessment  Sensory level: T6  Events: cerebrospinal fluid  Swirl obtained: Yes  CSF: clear  Attempts: 1  Hemodynamics: stable

## 2020-02-18 NOTE — PROGRESS NOTES
Wife of patient asked me to come out to waiting room to alert me to the fact that patient has expressed desire to end life if amputation goes above knee or if his leg does not heal. Dr. Briana Vila has been notified of this also.

## 2020-02-19 LAB
ANION GAP SERPL CALCULATED.3IONS-SCNC: 9 MMOL/L (ref 3–16)
BUN BLDV-MCNC: 8 MG/DL (ref 7–20)
CALCIUM SERPL-MCNC: 9.3 MG/DL (ref 8.3–10.6)
CHLORIDE BLD-SCNC: 97 MMOL/L (ref 99–110)
CO2: 26 MMOL/L (ref 21–32)
CREAT SERPL-MCNC: 0.8 MG/DL (ref 0.8–1.3)
GFR AFRICAN AMERICAN: >60
GFR NON-AFRICAN AMERICAN: >60
GLUCOSE BLD-MCNC: 111 MG/DL (ref 70–99)
HCT VFR BLD CALC: 35.7 % (ref 40.5–52.5)
HEMOGLOBIN: 12.1 G/DL (ref 13.5–17.5)
MCH RBC QN AUTO: 29.3 PG (ref 26–34)
MCHC RBC AUTO-ENTMCNC: 34 G/DL (ref 31–36)
MCV RBC AUTO: 86.4 FL (ref 80–100)
PDW BLD-RTO: 16.9 % (ref 12.4–15.4)
PLATELET # BLD: 200 K/UL (ref 135–450)
PMV BLD AUTO: 8.4 FL (ref 5–10.5)
POTASSIUM REFLEX MAGNESIUM: 4.4 MMOL/L (ref 3.5–5.1)
RBC # BLD: 4.14 M/UL (ref 4.2–5.9)
SODIUM BLD-SCNC: 132 MMOL/L (ref 136–145)
WBC # BLD: 6.8 K/UL (ref 4–11)

## 2020-02-19 PROCEDURE — 99221 1ST HOSP IP/OBS SF/LOW 40: CPT | Performed by: INTERNAL MEDICINE

## 2020-02-19 PROCEDURE — 6370000000 HC RX 637 (ALT 250 FOR IP)

## 2020-02-19 PROCEDURE — 97116 GAIT TRAINING THERAPY: CPT

## 2020-02-19 PROCEDURE — 36415 COLL VENOUS BLD VENIPUNCTURE: CPT

## 2020-02-19 PROCEDURE — 6370000000 HC RX 637 (ALT 250 FOR IP): Performed by: PHYSICIAN ASSISTANT

## 2020-02-19 PROCEDURE — 1200000000 HC SEMI PRIVATE

## 2020-02-19 PROCEDURE — 6370000000 HC RX 637 (ALT 250 FOR IP): Performed by: ORTHOPAEDIC SURGERY

## 2020-02-19 PROCEDURE — 85027 COMPLETE CBC AUTOMATED: CPT

## 2020-02-19 PROCEDURE — 97530 THERAPEUTIC ACTIVITIES: CPT

## 2020-02-19 PROCEDURE — 2580000003 HC RX 258: Performed by: ORTHOPAEDIC SURGERY

## 2020-02-19 PROCEDURE — 97161 PT EVAL LOW COMPLEX 20 MIN: CPT

## 2020-02-19 PROCEDURE — 6360000002 HC RX W HCPCS: Performed by: ORTHOPAEDIC SURGERY

## 2020-02-19 PROCEDURE — 6370000000 HC RX 637 (ALT 250 FOR IP): Performed by: INTERNAL MEDICINE

## 2020-02-19 PROCEDURE — 97165 OT EVAL LOW COMPLEX 30 MIN: CPT

## 2020-02-19 PROCEDURE — 80048 BASIC METABOLIC PNL TOTAL CA: CPT

## 2020-02-19 PROCEDURE — 83036 HEMOGLOBIN GLYCOSYLATED A1C: CPT

## 2020-02-19 PROCEDURE — 97535 SELF CARE MNGMENT TRAINING: CPT

## 2020-02-19 RX ORDER — ACETAMINOPHEN 325 MG/1
TABLET ORAL
Status: COMPLETED
Start: 2020-02-19 | End: 2020-02-19

## 2020-02-19 RX ORDER — DOXYCYCLINE HYCLATE 100 MG
100 TABLET ORAL EVERY 12 HOURS SCHEDULED
Status: DISCONTINUED | OUTPATIENT
Start: 2020-02-19 | End: 2020-02-21 | Stop reason: HOSPADM

## 2020-02-19 RX ORDER — ACETAMINOPHEN 325 MG/1
650 TABLET ORAL EVERY 4 HOURS PRN
Status: DISCONTINUED | OUTPATIENT
Start: 2020-02-19 | End: 2020-02-21 | Stop reason: HOSPADM

## 2020-02-19 RX ORDER — GABAPENTIN 300 MG/1
600 CAPSULE ORAL 3 TIMES DAILY
Status: DISCONTINUED | OUTPATIENT
Start: 2020-02-19 | End: 2020-02-21 | Stop reason: HOSPADM

## 2020-02-19 RX ADMIN — ACETAMINOPHEN 650 MG: 325 TABLET ORAL at 19:53

## 2020-02-19 RX ADMIN — CEFAZOLIN 2 G: 10 INJECTION, POWDER, FOR SOLUTION INTRAVENOUS at 01:35

## 2020-02-19 RX ADMIN — ENOXAPARIN SODIUM 40 MG: 40 INJECTION SUBCUTANEOUS at 08:28

## 2020-02-19 RX ADMIN — OXYCODONE 10 MG: 5 TABLET ORAL at 19:53

## 2020-02-19 RX ADMIN — GABAPENTIN 600 MG: 300 CAPSULE ORAL at 15:42

## 2020-02-19 RX ADMIN — OXYCODONE 10 MG: 5 TABLET ORAL at 10:46

## 2020-02-19 RX ADMIN — DOXYCYCLINE HYCLATE 100 MG: 100 TABLET, COATED ORAL at 21:18

## 2020-02-19 RX ADMIN — MORPHINE SULFATE 4 MG: 4 INJECTION, SOLUTION INTRAMUSCULAR; INTRAVENOUS at 09:21

## 2020-02-19 RX ADMIN — Medication 10 ML: at 21:18

## 2020-02-19 RX ADMIN — DOCUSATE SODIUM 100 MG: 100 CAPSULE, LIQUID FILLED ORAL at 21:18

## 2020-02-19 RX ADMIN — OXYCODONE 10 MG: 5 TABLET ORAL at 05:43

## 2020-02-19 RX ADMIN — MORPHINE SULFATE 4 MG: 4 INJECTION, SOLUTION INTRAMUSCULAR; INTRAVENOUS at 07:03

## 2020-02-19 RX ADMIN — OXYCODONE 10 MG: 5 TABLET ORAL at 15:42

## 2020-02-19 RX ADMIN — VANCOMYCIN HYDROCHLORIDE 1000 MG: 10 INJECTION, POWDER, LYOPHILIZED, FOR SOLUTION INTRAVENOUS at 10:47

## 2020-02-19 RX ADMIN — GABAPENTIN 600 MG: 300 CAPSULE ORAL at 08:36

## 2020-02-19 RX ADMIN — FAMOTIDINE 20 MG: 20 TABLET, FILM COATED ORAL at 08:28

## 2020-02-19 RX ADMIN — DOCUSATE SODIUM 100 MG: 100 CAPSULE, LIQUID FILLED ORAL at 08:28

## 2020-02-19 RX ADMIN — CEFAZOLIN 2 G: 10 INJECTION, POWDER, FOR SOLUTION INTRAVENOUS at 08:27

## 2020-02-19 RX ADMIN — FAMOTIDINE 20 MG: 20 TABLET, FILM COATED ORAL at 21:18

## 2020-02-19 RX ADMIN — GABAPENTIN 600 MG: 300 CAPSULE ORAL at 21:18

## 2020-02-19 RX ADMIN — MORPHINE SULFATE 4 MG: 4 INJECTION, SOLUTION INTRAMUSCULAR; INTRAVENOUS at 21:18

## 2020-02-19 ASSESSMENT — PAIN DESCRIPTION - LOCATION
LOCATION: LEG
LOCATION: LEG

## 2020-02-19 ASSESSMENT — PAIN SCALES - GENERAL
PAINLEVEL_OUTOF10: 7
PAINLEVEL_OUTOF10: 8
PAINLEVEL_OUTOF10: 7
PAINLEVEL_OUTOF10: 8
PAINLEVEL_OUTOF10: 7

## 2020-02-19 ASSESSMENT — PAIN DESCRIPTION - PAIN TYPE
TYPE: SURGICAL PAIN
TYPE: SURGICAL PAIN

## 2020-02-19 ASSESSMENT — PAIN DESCRIPTION - ORIENTATION
ORIENTATION: RIGHT
ORIENTATION: RIGHT

## 2020-02-19 NOTE — ANESTHESIA POSTPROCEDURE EVALUATION
Department of Anesthesiology  Postprocedure Note    Patient: Carolyn Miranda  MRN: [id number]  YOB: 1960  Date of evaluation: 2/18/2020  Time:  8:00 PM     Procedure Summary     Date:  02/18/20 Room / Location:  [facility]    Anesthesia Start:  1637 Anesthesia Stop:  4561    Procedure:  RIGHT BELOW THE KNEE AMPUTATION (Right Leg Lower) Diagnosis:       Abscess of leg      (RIGHT TIBIA ABSCESS, INFECTION)    Surgeon:  Adriana Ambrocio MD Responsible Provider:  Homero Chin MD    Anesthesia Type:  spinal, regional ASA Status:  3          Anesthesia Type: spinal, regional    Rody Phase I: Rody Score: 9    Rody Phase II:      Last vitals: Reviewed and per EMR flowsheets.        Anesthesia Post Evaluation    Comments: Postoperative Anesthesia Note    Name:    Carolyn Miranda  MRN:      [id number]    Patient Vitals in the past 12 hrs:  02/18/20 1935, Temp:100 °F (37.8 °C), Temp src:Temporal, Pulse:68, Resp:14, SpO2:100 %  02/18/20 1850, BP:127/76, Pulse:80, Resp:29, SpO2:100 %  02/18/20 1845, BP:134/80, Pulse:73, Resp:14, SpO2:100 %  02/18/20 1840, BP:115/79, Temp:97 °F (36.1 °C), Temp src:Temporal, Pulse:100, Resp:17, SpO2:97 %  02/18/20 1835, SpO2:93 %  02/18/20 1615, BP:(!) 93/58, Pulse:86, SpO2:97 %  02/18/20 1517, BP:(!) 144/88, Temp:98 °F (36.7 °C), Temp src:Temporal, Pulse:93, Resp:15     LABS:    CBC  Lab Results       Component                Value               Date/Time                  WBC                      18.5                10/21/2019                 HGB                      12.9 (A)            10/21/2019                 HCT                      38.6 (A)            10/21/2019                 PLT                      452                 10/21/2019            RENAL  Lab Results       Component                Value               Date/Time                  NA                       135                 10/21/2019                 K                        4.0 10/21/2019                 CL                       98                  10/21/2019                 CO2                      28                  10/21/2019                 BUN                      12                  10/21/2019                 CREATININE               1. 10                10/21/2019                 GLUCOSE                  109                 10/21/2019            COAGS  No results found for: PROTIME, INR, APTT    Intake & Output: In: -   Out: 50     Nausea & Vomiting:  No    Level of Consciousness:  Awake    Pain Assessment:  Adequate analgesia    Anesthesia Complications:  No apparent anesthetic complications    SUMMARY      Vital signs stable  OK to discharge from Stage I post anesthesia care.   Care transferred from Anesthesiology department on discharge from perioperative area

## 2020-02-19 NOTE — CONSULTS
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 Over the last months, he has had rapidly evolving, non-healing RLE wounds. Most recently, a wound culture of distal RLE wound yielded growth of MRSA sensitive to doxycycline. Due to the recurrent nature of these wounds, he elected R BKA completed 2/18/20. He has a smaller wound on the R calf that remains. His pain is controlled. He is afebrile. Current abx:  None        Past Surgical History:       Diagnosis Date    Cancer ([facility] bone cancer 1973    Hyperlipidemia     Hyperlipidemia 12/18/2019    Hyperlipidemia 12/18/2019    Hypertension     Hyperuricemia 12/18/2019    Hyperuricemia 12/18/2019    MDRO (multiple drug resistant organisms) resistance     MRSA right leg    Osteosarcoma ([facility] 12/18/2019         Procedure Laterality Date    EYE SURGERY      LEG AMPUTATION BELOW KNEE Right 2/18/2020    RIGHT BELOW THE KNEE AMPUTATION performed by Kristy Parra MD at [facility]    osteosarcoma right fibula    VASECTOMY  08       Social History:    TOBACCO:   reports that he has never smoked. He has never used smokeless tobacco.  ETOH:   reports current alcohol use. There is no history of illicit drug use or other significant epidemiologic exposures.       Family History:       Problem Relation Age of Onset    Breast Cancer Mother     Hypertension Father     Diabetes Maternal Grandfather        Current Medications:    Current Facility-Administered Medications: gabapentin (NEURONTIN) capsule 600 mg, 600 mg, Oral, TID  sodium chloride flush 0.9 % injection 10 mL, 10 mL, Intravenous, 2 times per day  sodium chloride flush 0.9 % injection 10 mL, 10 mL, Intravenous, PRN  morphine (PF) injection 2 mg, 2 mg, Intravenous, Q2H PRN **OR** morphine (PF) injection 4 mg, 4 mg, Intravenous, Q2H PRN  docusate sodium (COLACE) capsule 100 mg, 100 mg, Oral, BID  ondansetron (ZOFRAN) injection 4 mg, 4 mg, Intravenous, Q6H PRN  enoxaparin (LOVENOX) injection 40 mg, 40 mg, Subcutaneous, Daily  oxyCODONE (ROXICODONE) immediate release tablet 5 mg, 5 mg, Oral, Q4H PRN **OR** oxyCODONE (ROXICODONE) immediate release tablet 10 mg, 10 mg, Oral, Q4H PRN  famotidine (PEPCID) tablet 20 mg, 20 mg, Oral, BID      Allergies   Allergen Reactions    Neosporin [Neomycin-Polymyxin-Gramicidin] Rash        REVIEW OF SYSTEMS:    CONSTITUTIONAL:  No F/C/NS or weight loss   EYES:  negative for blurred vision, eye discharge, visual disturbance and icterus  HEENT:  negative for acute hearing loss, tinnitus, ear drainage, sinus pressure, nasal congestion, epistaxis and snoring  RESPIRATORY:  No cough, shortness of breath, hemoptysis  CARDIOVASCULAR:  negative for chest pain, palpitations, exertional chest pressure/discomfort, edema, syncope  GASTROINTESTINAL:  negative for nausea, vomiting, diarrhea, constipation, blood in stool and abdominal pain  GENITOURINARY:  negative for frequency, dysuria, urinary incontinence, decreased urine volume, and hematuria  HEMATOLOGIC/LYMPHATIC:  negative for easy bruising, bleeding and lymphadenopathy  ALLERGIC/IMMUNOLOGIC:  negative for recurrent infections, angioedema, anaphylaxis and drug reactions  ENDOCRINE:  negative for weight changes and diabetic symptoms including polyuria, polydipsia and polyphagia  MUSCULOSKELETAL:   Per HPI   NEUROLOGICAL:  negative for headaches, slurred speech, unilateral weakness  PSYCHIATRIC/BEHAVIORAL: negative for hallucinations, behavioral problems, confusion and agitation.        Objective:   PHYSICAL EXAM:      VITALS:  /77   Pulse 98   Temp 98.3 °F (36.8 °C) (Oral)   Resp 16 SUSCEPTIBLE  (NOTE)  Rifampin and Gentamicin should not be used alone for antimicrobial  therapy.  For methicillin resistant staphylococci, ciprofloxacin  should also not be used alone. Benzylpenicillin GLEN >=0.5: Resistant    Comment: RESISTANT    Trimethoprim + Sulfamethoxazole GLEN >=320: Resistant    Comment: RESISTANT   Comment: METHICILLIN RESISTANT STAPHYLOCOCCUS AUREUS         Radiology Review:  All pertinent images / reports were reviewed as a part of this visit. Assessment:     Patient Active Problem List   Diagnosis    HTN (hypertension)    Hyperlipidemia    Hyperuricemia    [facility]osarcoma ([facility]    History of amputation below knee, right ([facility] [address] [facility]       Beatrice Jackson is a 62yoM who is evaluated for the following:    Remote history of RLE sarcoma s/p resection of tibia and radiation    Recurrent, full-thickness, non-healing wounds RLE  Isolation of MRSA in recent wound culture   S/p R BKA  Remaining R calf wound noted    Allergy neosporin     Recs: Will continue po doxy for at least the next 10 days while the wound is healing   Recommend topical aquacel to the wound once surgical dressing is removed     Case d/w his ID at [facility]   We will follow        Sierra Aguilar M.D. Thank you for the opportunity to participate in the care of your patient.     Please do not hesitate to contact me:   [phone] office  [phone] mobile

## 2020-02-19 NOTE — PROGRESS NOTES
Occupational Therapy   Occupational Therapy Initial Assessment/Treatment/ Discharge  One time only  Date: 2020   Patient Name: Beatrice Jackson  MRN: [id number]     : 1960    Date of Service: 2020    Discharge Recommendations:  24 hour supervision or assist      Assessment   Assessment: Pt seen s/p a right below knee amputation. Pt supervision for functional mobility and transfer w/ SW. Pt mod I for bed mobility. Pt did not exhibit decreased ability to complete ADLs. Pt does not require further OT at this time. Prognosis: Good  Decision Making: Low Complexity  OT Education: OT Role;Transfer Training;ADL Adaptive Strategies;Precautions  REQUIRES OT FOLLOW UP: No  Activity Tolerance  Activity Tolerance: Patient Tolerated treatment well  Safety Devices  Safety Devices in place: Yes  Type of devices: Call light within reach; Left in bed;Gait belt;Nurse notified        Patient Diagnosis(es): The encounter diagnosis was Abscess of leg.     has a past medical history of Cancer ([facility] [address] Hyperlipidemia, Hyperlipidemia, Hyperlipidemia, Hypertension, Hyperuricemia, Hyperuricemia, MDRO (multiple drug resistant organisms) resistance, and Osteosarcoma ([facility] [address] has a past surgical history that includes Vasectomy (); other surgical history (); eye surgery; and Leg amputation below knee (Right, 2020). Restrictions  Restrictions/Precautions  Restrictions/Precautions: Weight Bearing  Required Braces or Orthoses?: No  Lower Extremity Weight Bearing Restrictions  Right Lower Extremity Weight Bearing: Non Weight Bearing    Subjective   General  Chart Reviewed: Yes  Patient assessed for rehabilitation services?: Yes  Family / Caregiver Present: Yes(wife)  Referring Practitioner: Mary Pichardo MD  Diagnosis: Right below knee amputation. Subjective  Subjective: Pt pleasant and agreeable to therapy. Pt appears to be using humor to stay positive in light of the situation.  Pt states that he has been working out Independent(per wife and pt)     Bed mobility  Supine to Sit: Modified independent(HOB elevated)  Sit to Supine: Modified independent(HOB elevated)  Scooting: Supervision     Transfers  Sit to stand: Supervision(at )  Stand to sit: Supervision     LUE Strength  Gross LUE Strength: WFL  RUE Strength  Gross RUE Strength: WFL      Plan   Plan  Times per week: 1x only    AM-PAC Score  AM-PAC Inpatient Daily Activity Raw Score: 24 (02/19/20 1605)  AM-PAC Inpatient ADL T-Scale Score : 57.54 (02/19/20 1605)  ADL Inpatient CMS 0-100% Score: 0 (02/19/20 1605)  ADL Inpatient CMS G-Code Modifier : [address] (02/19/20 1605)    Goals  Short term goals  Time Frame for Short term goals: 1 week unless otherwise specified by 2/26/20. Short term goal 1: Pt will complete toilet transfer w/ supervision by 2/20/20. GOAL MET 2/19/20.    Patient Goals   Patient goals : to get back to normal     Therapy Time   Individual Concurrent Group Co-treatment   Time In 1507         Time Out 1533         Minutes 26         Timed Code Treatment Minutes: Guy Bruno 1640 S/OT

## 2020-02-19 NOTE — CARE COORDINATION
CASE MANAGEMENT INITIAL ASSESSMENT      Reviewed chart and met with patient today, re: post op  Explained Case Management role/services. Family present: yes  Primary contact information: Vern Shetty [phone]    Admit date/status: 2/18/20 IP  Diagnosis: Right BKA    Insurance: [facility]  Precert required for SNF - Y      3 night stay required - N    Living arrangements, Adls, care needs, prior to admission: Lives in a two story house with wife. Independent in all ADL's and still works. Transportation: Family    Durable Medical Equipment at home: Walker_X_Cane_X_RTS_x_ BSC__Shower Chair__  02__ HHN__ CPAP__  BiPap__  Hospital Bed__ W/C___ Other______Jazzy WC___    Services in the home and/or outpatient, prior to admission: None      PT/OT recs: None seen at this time. Hospital Exemption Notification (HEN): needed for SNF, not initiated. Barriers to discharge: None    Plan/comments: CM met with pt and wife at bedside for initial assessment. [place] [facility] [facility]. and if he would need IV ABX at discharge he would like [address] [address]: [phone].  CM following-eYny Joseph RN      ECOC on chart for MD signature

## 2020-02-19 NOTE — PROGRESS NOTES
Limits  Hearing: Within functional limits     Subjective  General  Chart Reviewed: Yes  Patient assessed for rehabilitation services?: Yes  Response To Previous Treatment: Not applicable  Family / Caregiver Present: Yes(wife)  Referring Practitioner: Myra Waldron MD  Referral Date : 02/18/20  Diagnosis: R leg abcess, s/pp BKA on 2/19/20  Follows Commands: Within Functional Limits  General Comment  Comments: Pt resting in bed upin entry, RN cleared pt for therapy  Subjective  Subjective: Pt agreeable to PT  Pain Screening  Patient Currently in Pain: Yes  Pain Assessment  Pain Assessment: 0-10  Pain Level: 7  Pain Type: Surgical pain  Pain Location: Leg(back of calf)  Pain Orientation: Right  Non-Pharmaceutical Pain Intervention(s): Ambulation/Increased Activity; Therapeutic presence;Repositioned  Vital Signs  Patient Currently in Pain: Yes  Pre Treatment Pain Screening  Intervention List: Patient able to continue with treatment    Orientation  Orientation  Overall Orientation Status: Within Normal Limits  Social/Functional History  Social/Functional History  Lives With: Spouse  Type of Home: House  Home Layout: Two level, Bed/Bath upstairs, 1/2 bath on main level, Able to Live on Main level with bedroom/bathroom  Home Access: Stairs to enter without rails  Entrance Stairs - Number of Steps: 1 COLLEEN  Bathroom Shower/Tub: Walk-in shower  Bathroom Toilet: Standard  Bathroom Equipment: (No bathroom equipment.  Pt states he has a vanity to use to in place of a grab bar. )  Home Equipment: Crutches, Wheelchair-manual  ADL Assistance: Independent  Homemaking Assistance: Independent  Homemaking Responsibilities: No  Meal Prep Responsibility: No  Laundry Responsibility: No  Cleaning Responsibility: No  Shopping Responsibility: No  Ambulation Assistance: Independent  Transfer Assistance: Independent  Active : Yes  Mode of Transportation: Car  Occupation: Full time employment  Type of occupation: accounting and  for [employer]  Leisure & Hobbies: boating, fishing, hunting         Objective          AROM RLE (degrees)  RLE General AROM: WNL at hip, knee extension 0 degrees, flexion 55 degrees (pt reporting swelling preventing further flexion)  AROM LLE (degrees)  LLE AROM : WNL  Strength RLE  Comment: WNL at hip, knee not formally assessed, but observed to be at least 3/5 in available range  Strength LLE  Strength LLE: WNL  Comment: grossly 5/5 throughout     Sensation  Overall Sensation Status: (pt reporting no phantom sensations during session)  Bed mobility  Scooting: Independent(to EOB)  Transfers  Sit to Stand: Supervision  Stand to sit: Supervision  Bed to Chair: Supervision  Ambulation  Ambulation?: Yes  WB Status: NWB RLE  Ambulation 1  Surface: level tile  Device: Standard Walker  Assistance: Supervision  Quality of Gait: hop to pattern, steady with no LOB  Distance: 15 ft plus 25 ft plus 15 ft x 2 plus 25 ft  Stairs/Curb  Stairs?: Yes  Stairs  # Steps : 1  Stairs Height: 6\"  Rails: None  Device: Standard walker  Assistance: Contact guard assistance  Comment: PT demo'd forward and backward ascent, pt preferred and practiced forward only, as well as forward on descent and was safe     Balance  Posture: Good  Sitting - Static: Good  Sitting - Dynamic: Good  Standing - Static: Good  Standing - Dynamic: -        Plan   Plan  Times per week: one time only  Current Treatment Recommendations: Strengthening, Gait Training, Stair training, Balance Training, Functional Mobility Training, Endurance Training, Transfer Training  Safety Devices  Type of devices:  All fall risk precautions in place, Left in bed, Bed alarm in place, Call light within reach, Nurse notified, Gait belt, Patient at risk for falls      AM-PAC Score  AM-PAC Inpatient Mobility Raw Score : 23 (02/19/20 1636)  AM-PAC Inpatient T-Scale Score : 56.93 (02/19/20 1636)  Mobility Inpatient CMS 0-100% Score: 11.2 (02/19/20 1636)  Mobility Inpatient CMS G-Code Modifier : CI (02/19/20 2836)          Goals  Short term goals  Time Frame for Short term goals: 2/19/20  Short term goal 1: Pt will ambulate 25 ft with SW and supervision; goal met 2/19/20  Short term goal 2: Pt will perform bed mobility and transfers with supervision; goal met 2/19/20  Short term goal 3: Pt will negotiate 1 curb step with SW and CGA; goal met 2/19/20  Patient Goals   Patient goals : \"to get stronger\"       Therapy Time   Individual Concurrent Group Co-treatment   Time In 1501         Time Out 1534         Minutes 33         Timed Code Treatment Minutes: [address], PT

## 2020-02-19 NOTE — PROGRESS NOTES
incision. Keep dressing in place until follow up with Dr. Arline Rain on Wednesday. 2:  Continue Deep venous thrombosis prophylaxis - Lovenox  3:  Continue Pain Control  4:  PT, OT, NWB RLE  5:  Discharge home with Linh Argueta pending ID recs. Patient is to follow up with Dr. Arline Rain on Wednesday.     Kiki Lynne, CNP

## 2020-02-20 ENCOUNTER — APPOINTMENT (OUTPATIENT)
Dept: CT IMAGING | Age: 60
DRG: 475 | End: 2020-02-20
Attending: ORTHOPAEDIC SURGERY
Payer: COMMERCIAL

## 2020-02-20 ENCOUNTER — APPOINTMENT (OUTPATIENT)
Dept: GENERAL RADIOLOGY | Age: 60
DRG: 475 | End: 2020-02-20
Attending: ORTHOPAEDIC SURGERY
Payer: COMMERCIAL

## 2020-02-20 LAB
ANION GAP SERPL CALCULATED.3IONS-SCNC: 10 MMOL/L (ref 3–16)
BASOPHILS ABSOLUTE: 0.1 K/UL (ref 0–0.2)
BASOPHILS RELATIVE PERCENT: 0.8 %
BUN BLDV-MCNC: 7 MG/DL (ref 7–20)
CALCIUM SERPL-MCNC: 9.4 MG/DL (ref 8.3–10.6)
CHLORIDE BLD-SCNC: 89 MMOL/L (ref 99–110)
CHOLESTEROL, TOTAL: 142 MG/DL (ref 0–199)
CO2: 28 MMOL/L (ref 21–32)
CORTISOL TOTAL: 16.2 UG/DL
CREAT SERPL-MCNC: 0.7 MG/DL (ref 0.8–1.3)
EOSINOPHILS ABSOLUTE: 0.1 K/UL (ref 0–0.6)
EOSINOPHILS RELATIVE PERCENT: 0.6 %
ESTIMATED AVERAGE GLUCOSE: 105.4 MG/DL
GFR AFRICAN AMERICAN: >60
GFR NON-AFRICAN AMERICAN: >60
GLUCOSE BLD-MCNC: 120 MG/DL (ref 70–99)
HBA1C MFR BLD: 5.3 %
HCT VFR BLD CALC: 34.5 % (ref 40.5–52.5)
HDLC SERPL-MCNC: 45 MG/DL (ref 40–60)
HEMOGLOBIN: 11.6 G/DL (ref 13.5–17.5)
LDL CHOLESTEROL CALCULATED: 82 MG/DL
LYMPHOCYTES ABSOLUTE: 0.7 K/UL (ref 1–5.1)
LYMPHOCYTES RELATIVE PERCENT: 7.5 %
MCH RBC QN AUTO: 29 PG (ref 26–34)
MCHC RBC AUTO-ENTMCNC: 33.7 G/DL (ref 31–36)
MCV RBC AUTO: 85.8 FL (ref 80–100)
MONOCYTES ABSOLUTE: 1.4 K/UL (ref 0–1.3)
MONOCYTES RELATIVE PERCENT: 13.6 %
NEUTROPHILS ABSOLUTE: 7.7 K/UL (ref 1.7–7.7)
NEUTROPHILS RELATIVE PERCENT: 77.5 %
PDW BLD-RTO: 16.5 % (ref 12.4–15.4)
PLATELET # BLD: 205 K/UL (ref 135–450)
PMV BLD AUTO: 8.6 FL (ref 5–10.5)
POTASSIUM SERPL-SCNC: 4 MMOL/L (ref 3.5–5.1)
RBC # BLD: 4.02 M/UL (ref 4.2–5.9)
SODIUM BLD-SCNC: 127 MMOL/L (ref 136–145)
SODIUM URINE: 98 MMOL/L
TRIGL SERPL-MCNC: 77 MG/DL (ref 0–150)
TSH SERPL DL<=0.05 MIU/L-ACNC: 2.05 UIU/ML (ref 0.27–4.2)
URIC ACID, SERUM: 5.7 MG/DL (ref 3.5–7.2)
VLDLC SERPL CALC-MCNC: 15 MG/DL
WBC # BLD: 10 K/UL (ref 4–11)

## 2020-02-20 PROCEDURE — 99231 SBSQ HOSP IP/OBS SF/LOW 25: CPT | Performed by: INTERNAL MEDICINE

## 2020-02-20 PROCEDURE — 1200000000 HC SEMI PRIVATE

## 2020-02-20 PROCEDURE — 36415 COLL VENOUS BLD VENIPUNCTURE: CPT

## 2020-02-20 PROCEDURE — 83036 HEMOGLOBIN GLYCOSYLATED A1C: CPT

## 2020-02-20 PROCEDURE — 2580000003 HC RX 258: Performed by: ORTHOPAEDIC SURGERY

## 2020-02-20 PROCEDURE — 82088 ASSAY OF ALDOSTERONE: CPT

## 2020-02-20 PROCEDURE — 6370000000 HC RX 637 (ALT 250 FOR IP): Performed by: NURSE PRACTITIONER

## 2020-02-20 PROCEDURE — 71046 X-RAY EXAM CHEST 2 VIEWS: CPT

## 2020-02-20 PROCEDURE — 6360000002 HC RX W HCPCS: Performed by: ORTHOPAEDIC SURGERY

## 2020-02-20 PROCEDURE — 6360000004 HC RX CONTRAST MEDICATION: Performed by: ORTHOPAEDIC SURGERY

## 2020-02-20 PROCEDURE — 6370000000 HC RX 637 (ALT 250 FOR IP): Performed by: INTERNAL MEDICINE

## 2020-02-20 PROCEDURE — 84443 ASSAY THYROID STIM HORMONE: CPT

## 2020-02-20 PROCEDURE — 84550 ASSAY OF BLOOD/URIC ACID: CPT

## 2020-02-20 PROCEDURE — 71260 CT THORAX DX C+: CPT

## 2020-02-20 PROCEDURE — 80048 BASIC METABOLIC PNL TOTAL CA: CPT

## 2020-02-20 PROCEDURE — 82533 TOTAL CORTISOL: CPT

## 2020-02-20 PROCEDURE — 80061 LIPID PANEL: CPT

## 2020-02-20 PROCEDURE — 85025 COMPLETE CBC W/AUTO DIFF WBC: CPT

## 2020-02-20 PROCEDURE — 84300 ASSAY OF URINE SODIUM: CPT

## 2020-02-20 PROCEDURE — 6370000000 HC RX 637 (ALT 250 FOR IP): Performed by: ORTHOPAEDIC SURGERY

## 2020-02-20 PROCEDURE — 2580000003 HC RX 258: Performed by: INTERNAL MEDICINE

## 2020-02-20 PROCEDURE — 86480 TB TEST CELL IMMUN MEASURE: CPT

## 2020-02-20 PROCEDURE — 6370000000 HC RX 637 (ALT 250 FOR IP): Performed by: PHYSICIAN ASSISTANT

## 2020-02-20 RX ORDER — BUTALBITAL, ACETAMINOPHEN AND CAFFEINE 50; 325; 40 MG/1; MG/1; MG/1
1 TABLET ORAL EVERY 4 HOURS PRN
Status: DISCONTINUED | OUTPATIENT
Start: 2020-02-20 | End: 2020-02-21 | Stop reason: HOSPADM

## 2020-02-20 RX ORDER — LISINOPRIL 10 MG/1
10 TABLET ORAL DAILY
Status: DISCONTINUED | OUTPATIENT
Start: 2020-02-20 | End: 2020-02-21 | Stop reason: HOSPADM

## 2020-02-20 RX ORDER — BUTALBITAL, ASPIRIN, AND CAFFEINE 325; 50; 40 MG/1; MG/1; MG/1
1 CAPSULE ORAL EVERY 4 HOURS PRN
Status: DISCONTINUED | OUTPATIENT
Start: 2020-02-20 | End: 2020-02-20

## 2020-02-20 RX ORDER — SODIUM CHLORIDE 9 MG/ML
INJECTION, SOLUTION INTRAVENOUS CONTINUOUS
Status: DISPENSED | OUTPATIENT
Start: 2020-02-20 | End: 2020-02-20

## 2020-02-20 RX ADMIN — OXYCODONE 10 MG: 5 TABLET ORAL at 13:29

## 2020-02-20 RX ADMIN — OXYCODONE 10 MG: 5 TABLET ORAL at 02:54

## 2020-02-20 RX ADMIN — GABAPENTIN 600 MG: 300 CAPSULE ORAL at 14:19

## 2020-02-20 RX ADMIN — Medication 10 ML: at 07:51

## 2020-02-20 RX ADMIN — OXYCODONE 10 MG: 5 TABLET ORAL at 17:46

## 2020-02-20 RX ADMIN — IOPAMIDOL 75 ML: 755 INJECTION, SOLUTION INTRAVENOUS at 16:27

## 2020-02-20 RX ADMIN — FAMOTIDINE 20 MG: 20 TABLET, FILM COATED ORAL at 07:50

## 2020-02-20 RX ADMIN — DOXYCYCLINE HYCLATE 100 MG: 100 TABLET, COATED ORAL at 20:22

## 2020-02-20 RX ADMIN — OXYCODONE 10 MG: 5 TABLET ORAL at 21:44

## 2020-02-20 RX ADMIN — OXYCODONE 10 MG: 5 TABLET ORAL at 07:50

## 2020-02-20 RX ADMIN — GABAPENTIN 600 MG: 300 CAPSULE ORAL at 20:22

## 2020-02-20 RX ADMIN — DOCUSATE SODIUM 100 MG: 100 CAPSULE, LIQUID FILLED ORAL at 07:50

## 2020-02-20 RX ADMIN — DOCUSATE SODIUM 100 MG: 100 CAPSULE, LIQUID FILLED ORAL at 20:22

## 2020-02-20 RX ADMIN — DOXYCYCLINE HYCLATE 100 MG: 100 TABLET, COATED ORAL at 08:02

## 2020-02-20 RX ADMIN — LISINOPRIL 10 MG: 10 TABLET ORAL at 11:21

## 2020-02-20 RX ADMIN — GABAPENTIN 600 MG: 300 CAPSULE ORAL at 07:50

## 2020-02-20 RX ADMIN — SODIUM CHLORIDE: 9 INJECTION, SOLUTION INTRAVENOUS at 11:22

## 2020-02-20 RX ADMIN — ACETAMINOPHEN 650 MG: 325 TABLET ORAL at 20:22

## 2020-02-20 RX ADMIN — ENOXAPARIN SODIUM 40 MG: 40 INJECTION SUBCUTANEOUS at 07:50

## 2020-02-20 RX ADMIN — FAMOTIDINE 20 MG: 20 TABLET, FILM COATED ORAL at 20:22

## 2020-02-20 ASSESSMENT — PAIN SCALES - GENERAL
PAINLEVEL_OUTOF10: 7
PAINLEVEL_OUTOF10: 8
PAINLEVEL_OUTOF10: 7

## 2020-02-20 ASSESSMENT — PAIN DESCRIPTION - PAIN TYPE: TYPE: SURGICAL PAIN

## 2020-02-20 ASSESSMENT — PAIN DESCRIPTION - ORIENTATION: ORIENTATION: RIGHT

## 2020-02-20 ASSESSMENT — PAIN DESCRIPTION - LOCATION: LOCATION: LEG

## 2020-02-20 NOTE — CONSULTS
Hospital Medicine  Consult History & Physical        Chief Complaint: Abnormal labs    Date of Service: Pt seen/examined in consultation on 02/20/20     History Of Present Illness:      60 y.o. male who we are asked to see/evaluate by María Blancas MD for medical management of hyponatremia  Patient was admitted for persistent right tibia abscess refractory to conservative treatment. Had a successful right below the knee amputation with no immediate complications. Doing fine postoperatively. Noted to have elevated blood pressures and low sodium levels. Hospitalist service was consulted. Patient has known history of hypertension. On lisinopril and hydrochlorothiazide combination. Has not been taking that for past 3 days. On no other medications that could cause hyponatremia  Regularly drinks 2 or 3 beers a day. Has not consumed any beers over past week, no signs of withdrawal.  Some nausea past couple days postoperatively, but improving and has not affected the patient's oral intake  Intake and output not available in the chart  Daily weights not available in the chart. Has tolerated the surgery well  No other accompanying symptoms  Nothing else that makes the patient feel better or worse  Comfortable and pleasant at the time of this evaluation.     Past Medical History:        Diagnosis Date    Cancer [facility]) bone cancer 1973    Hyperlipidemia     Hyperlipidemia 12/18/2019    Hyperlipidemia 12/18/2019    Hypertension     Hyperuricemia 12/18/2019    Hyperuricemia 12/18/2019    MDRO (multiple drug resistant organisms) resistance     MRSA right leg    [facility] 12/18/2019       Past Surgical History:        Procedure Laterality Date    EYE SURGERY      LEG AMPUTATION BELOW KNEE Right 2/18/2020    RIGHT BELOW THE KNEE AMPUTATION performed by Mraía Blancas MD at [address]    osteosarcoma right fibula    VASECTOMY  08       Medications Prior to Admission: equal, round, and reactive to light. Extra ocular muscles intact. Conjunctivae/corneas clear. Neck: Supple, with full range of motion. No jugular venous distention. Trachea midline. Respiratory:  Normal respiratory effort. Clear to auscultation, bilaterally without Rales/Wheezes/Rhonchi. Cardiovascular: Regular rate and rhythm with normal S1/S2 without murmurs, rubs or gallops. Abdomen: Soft, non-tender, non-distended with normal bowel sounds. Musculoskeletal: Right BKA, clean. No clubbing, cyanosis or edema bilaterally. Skin: Skin color, texture, turgor normal.  No rashes or lesions. Neurologic:  Neurovascularly intact without any focal sensory/motor deficits. Cranial nerves: II-XII intact, grossly non-focal.  Psychiatric: Alert and oriented, thought content appropriate, normal insight  Capillary Refill: Brisk,< 3 seconds   Peripheral Pulses: +2 palpable, equal bilaterally     Labs:     Recent Labs     02/19/20  0901   WBC 6.8   HGB 12.1*   HCT 35.7*        Recent Labs     02/19/20  0901 02/20/20  0704   * 127*   K 4.4 4.0   CL 97* 89*   CO2 26 28   BUN 8 7   CREATININE 0.8 0.7*   CALCIUM 9.3 9.4     No results for input(s): AST, ALT, BILIDIR, BILITOT, ALKPHOS in the last 72 hours. No results for input(s): INR in the last 72 hours. No results for input(s): Don Rower in the last 72 hours. Urinalysis:  No results found for: Mary Alvarado, BACTERIA, [address] [facility], [address], [place] [facility] [address]    Radiology: I have reviewed the radiology reports with the following interpretation:     No orders to display          EKG:  I have reviewed the EKG with the following interpretation:    Not done today      ASSESSMENT:    Active Hospital Problems    Diagnosis Date Noted    History of amputation below knee, right [facility]) [Z89.511] 02/18/2020       PLAN:    Hyponatremia  Patient is a regular beer drinker, and is on hydrochlorothiazide at home. However, these have not been an issue over past few days.   On the other hand, patient's baseline sodium is also not low. We do not have intake and output data in the chart. Postoperative state, even if the oral intake is unremarkable, can cause dehydration. At this point, I will assume patient has hypovolemic hyponatremia and start a limited normal saline infusion. Hydrochlorothiazide needs to be stopped  I will also check cortisol, aldosterone, thyroid stimulating hormone levels. Urine sodium will also be checked. Wrote an order to proceed with strict intake and output from now on  Daily weights will also be helpful  If everything goes as predicted, I would expect patient's sodium level to improve by tomorrow with no need for further action. If sodium level continues to drop, nephrology consult may need to be considered  Patient does not have a recent chest x-ray in the chart. Not a smoker, risk for lung malignancy is low. However, I wish to proceed with a chest x-ray PA and lateral to complete the work-up. Hypertension  Resume lisinopril, without hydrochlorothiazide. No need for as needed antihypertensives as patient is otherwise asymptomatic. Tibia abscess  Had right below the knee amputation  Follow-up per primary team  Infectious diseases also consulted    Alcohol abuse  No signs of withdrawal  Patient's sodium levels were unremarkable prior to admission. I do not think this is a significant factor in patient's current low sodium levels. Of course, limiting alcohol intake will always be helpful. Discussed with the patient and wife, questions answered    DVT Prophylaxis: Per primary  Diet: DIET GENERAL;  Code Status: Full Code    PT/OT Eval Status: Active and ongoing    Dispo -discharge tomorrow if sodium level remain stable over the next 24 hours. Thank you for the consultation, will follow up as needed    Esperanza Valenzuela MD      Addendum    Chest x-ray shows right hilar fullness, suspicious for a mass or enlarged lymph node.   CT scan of the chest

## 2020-02-20 NOTE — PROGRESS NOTES
Department of Orthopedic Surgery  Nurse Practitioner   Progress Note    Subjective:     POD #2 right BKA  Systemic or Specific Complaints: Patient resting in bed. Complaining of pain, but currently controlled. Wife at bedside. Hospitalist, lab and RN had also been at bedside. Objective:     Patient Vitals for the past 24 hrs:   BP Temp Temp src Pulse Resp SpO2   02/20/20 0730 121/73 98.7 °F (37.1 °C) Oral 100 16 95 %   02/20/20 0250 (!) 178/92 98.8 °F (37.1 °C) Oral 107 16 94 %   02/19/20 2120 -- 100.2 °F (37.9 °C) Oral -- -- --   02/19/20 1945 -- -- Oral -- 16 --   02/19/20 1943 (!) 141/81 102.8 °F (39.3 °C) Oral 112 16 94 %   02/19/20 1448 -- -- -- 98 -- 96 %   02/19/20 1447 131/77 98.3 °F (36.8 °C) Oral 95 16 97 %       General: alert, appears stated age, cooperative and no distress   Wound: Wound clean and dry no evidence of infection. ACE CDI   Motion: Painful range of Motion in affected extremity   DVT Exam: No evidence of DVT seen on physical exam.     Additional exam: NVI    Data Review  CBC:   Lab Results   Component Value Date    WBC 6.8 02/19/2020    RBC 4.14 02/19/2020    HGB 12.1 02/19/2020    HCT 35.7 02/19/2020     02/19/2020       Renal:   Lab Results   Component Value Date     02/20/2020    K 4.0 02/20/2020    K 4.4 02/19/2020    CL 89 02/20/2020    CO2 28 02/20/2020    BUN 7 02/20/2020    CREATININE 0.7 02/20/2020    GLUCOSE 120 02/20/2020    CALCIUM 9.4 02/20/2020        Sx patho: pending    Assessment:     S/p right BKA    Plan:      1:  Continue current plan of care. Post-op labs reviewed. Hyponatremia noted, worsened overnight. Patient with max temp of 102.8 overnight. Hospitalist reconsulted and saw patient this morning, orders placed and testing pending. Continue IV abx. ID consulted. No prosthetic or stump  per Dr. Stacey Vincent, no pressure to incision. Keep dressing in place until follow up with Dr. Stacey Vincent on Wednesday.   2:  Continue Deep venous thrombosis prophylaxis -

## 2020-02-20 NOTE — PLAN OF CARE
Problem: Pain:  Goal: Control of acute pain  Description  Control of acute pain  Outcome: Ongoing  Note:   Pt scoring pain on 0-10 scale. Pain medications given per MAR. Pt instructed to call out when pain level increasing. Call light within reach. Nurse will continue to reassess and monitor.

## 2020-02-20 NOTE — PROGRESS NOTES
(hypertension) 11/03/2011     Remote history sarcoma RLE   Non-healing wounds RLE  Recent isolation MRSA in wound culture, R ankle  S/p R BKA     Post-op fever without evolving focal symptoms     Abnormal CXR     Plan:   Continuing doxy  Will need to keep an eye on the remaining more proximal R calf wound   Chest CT ordered  Monitor fever curve.   No change in abx coverage recommended at this time       Discussed with patient/family, all questions answered        Kalen Ellis MD  Phone: [phone]   Fax : [phone]

## 2020-02-21 VITALS
DIASTOLIC BLOOD PRESSURE: 80 MMHG | SYSTOLIC BLOOD PRESSURE: 148 MMHG | TEMPERATURE: 98.7 F | OXYGEN SATURATION: 92 % | HEIGHT: 69 IN | RESPIRATION RATE: 15 BRPM | WEIGHT: 182.1 LBS | HEART RATE: 99 BPM | BODY MASS INDEX: 26.97 KG/M2

## 2020-02-21 LAB
ALDOSTERONE: 4 NG/DL
ANION GAP SERPL CALCULATED.3IONS-SCNC: 11 MMOL/L (ref 3–16)
BUN BLDV-MCNC: 8 MG/DL (ref 7–20)
CALCIUM SERPL-MCNC: 8.8 MG/DL (ref 8.3–10.6)
CHLORIDE BLD-SCNC: 92 MMOL/L (ref 99–110)
CO2: 25 MMOL/L (ref 21–32)
CREAT SERPL-MCNC: 0.7 MG/DL (ref 0.8–1.3)
ESTIMATED AVERAGE GLUCOSE: 105.4 MG/DL
GFR AFRICAN AMERICAN: >60
GFR NON-AFRICAN AMERICAN: >60
GLUCOSE BLD-MCNC: 114 MG/DL (ref 70–99)
HBA1C MFR BLD: 5.3 %
POTASSIUM SERPL-SCNC: 4.1 MMOL/L (ref 3.5–5.1)
SODIUM BLD-SCNC: 128 MMOL/L (ref 136–145)

## 2020-02-21 PROCEDURE — 6360000002 HC RX W HCPCS: Performed by: ORTHOPAEDIC SURGERY

## 2020-02-21 PROCEDURE — 36415 COLL VENOUS BLD VENIPUNCTURE: CPT

## 2020-02-21 PROCEDURE — 6370000000 HC RX 637 (ALT 250 FOR IP): Performed by: ORTHOPAEDIC SURGERY

## 2020-02-21 PROCEDURE — 99222 1ST HOSP IP/OBS MODERATE 55: CPT | Performed by: INTERNAL MEDICINE

## 2020-02-21 PROCEDURE — 6370000000 HC RX 637 (ALT 250 FOR IP): Performed by: INTERNAL MEDICINE

## 2020-02-21 PROCEDURE — 80048 BASIC METABOLIC PNL TOTAL CA: CPT

## 2020-02-21 PROCEDURE — 2580000003 HC RX 258: Performed by: ORTHOPAEDIC SURGERY

## 2020-02-21 PROCEDURE — 6370000000 HC RX 637 (ALT 250 FOR IP): Performed by: PHYSICIAN ASSISTANT

## 2020-02-21 PROCEDURE — 6370000000 HC RX 637 (ALT 250 FOR IP): Performed by: THORACIC SURGERY (CARDIOTHORACIC VASCULAR SURGERY)

## 2020-02-21 PROCEDURE — 99223 1ST HOSP IP/OBS HIGH 75: CPT | Performed by: THORACIC SURGERY (CARDIOTHORACIC VASCULAR SURGERY)

## 2020-02-21 PROCEDURE — 93308 TTE F-UP OR LMTD: CPT

## 2020-02-21 RX ORDER — LISINOPRIL 10 MG/1
10 TABLET ORAL DAILY
Qty: 30 TABLET | Refills: 0 | Status: SHIPPED
Start: 2020-02-22 | End: 2020-03-02 | Stop reason: CLARIF

## 2020-02-21 RX ORDER — OXYCODONE HYDROCHLORIDE 5 MG/1
5-10 TABLET ORAL EVERY 4 HOURS PRN
Qty: 28 TABLET | Refills: 0 | Status: SHIPPED | OUTPATIENT
Start: 2020-02-21 | End: 2020-02-24

## 2020-02-21 RX ORDER — DOXYCYCLINE HYCLATE 100 MG
100 TABLET ORAL EVERY 12 HOURS SCHEDULED
Qty: 14 TABLET | Refills: 0 | Status: SHIPPED | OUTPATIENT
Start: 2020-02-21 | End: 2020-02-28

## 2020-02-21 RX ORDER — PSEUDOEPHEDRINE HCL 30 MG
100 TABLET ORAL 2 TIMES DAILY
Qty: 30 CAPSULE | Refills: 0 | Status: SHIPPED | OUTPATIENT
Start: 2020-02-21 | End: 2020-03-02

## 2020-02-21 RX ADMIN — Medication 10 ML: at 10:08

## 2020-02-21 RX ADMIN — ENOXAPARIN SODIUM 40 MG: 40 INJECTION SUBCUTANEOUS at 10:08

## 2020-02-21 RX ADMIN — DOCUSATE SODIUM 100 MG: 100 CAPSULE, LIQUID FILLED ORAL at 10:08

## 2020-02-21 RX ADMIN — DOXYCYCLINE HYCLATE 100 MG: 100 TABLET, COATED ORAL at 10:07

## 2020-02-21 RX ADMIN — LISINOPRIL 10 MG: 10 TABLET ORAL at 10:07

## 2020-02-21 RX ADMIN — OXYCODONE 10 MG: 5 TABLET ORAL at 14:13

## 2020-02-21 RX ADMIN — GABAPENTIN 600 MG: 300 CAPSULE ORAL at 14:31

## 2020-02-21 RX ADMIN — METOPROLOL TARTRATE 25 MG: 25 TABLET, FILM COATED ORAL at 10:07

## 2020-02-21 RX ADMIN — GABAPENTIN 600 MG: 300 CAPSULE ORAL at 10:07

## 2020-02-21 RX ADMIN — FAMOTIDINE 20 MG: 20 TABLET, FILM COATED ORAL at 10:07

## 2020-02-21 RX ADMIN — OXYCODONE 10 MG: 5 TABLET ORAL at 10:07

## 2020-02-21 RX ADMIN — OXYCODONE 10 MG: 5 TABLET ORAL at 01:48

## 2020-02-21 RX ADMIN — OXYCODONE 10 MG: 5 TABLET ORAL at 05:56

## 2020-02-21 ASSESSMENT — PAIN SCALES - GENERAL
PAINLEVEL_OUTOF10: 8
PAINLEVEL_OUTOF10: 7
PAINLEVEL_OUTOF10: 7
PAINLEVEL_OUTOF10: 8

## 2020-02-21 NOTE — PROGRESS NOTES
Department of Orthopedic Surgery  Nurse Practitioner   Progress Note    Subjective:     POD #3 right BKA  Systemic or Specific Complaints: Patient resting in bed. Patient feeling very frustrated and stating he is going home today. Wife at bedside. Patient and wife with many questions. RN at bedside. Objective:     Patient Vitals for the past 24 hrs:   BP Temp Temp src Pulse Resp SpO2 Weight   02/21/20 0909 -- 98.7 °F (37.1 °C) -- -- -- 92 % --   02/21/20 0908 (!) 148/80 98.7 °F (37.1 °C) Oral 99 15 93 % --   02/21/20 0606 -- -- -- -- -- -- 182 lb 1.6 oz (82.6 kg)   02/20/20 2230 118/69 98 °F (36.7 °C) Oral 90 14 95 % --   02/20/20 2009 126/81 100.7 °F (38.2 °C) Oral 108 14 98 % --   02/20/20 1855 135/80 99.4 °F (37.4 °C) Oral 101 16 91 % --       General: alert, appears stated age, cooperative and no distress   Wound: Wound clean and dry no evidence of infection. ACE CDI   Motion: Painful range of Motion in affected extremity   DVT Exam: No evidence of DVT seen on physical exam.     Additional exam: NVI    Data Review  CBC:   Lab Results   Component Value Date    WBC 10.0 02/20/2020    RBC 4.02 02/20/2020    HGB 11.6 02/20/2020    HCT 34.5 02/20/2020     02/20/2020       Renal:   Lab Results   Component Value Date     02/21/2020    K 4.1 02/21/2020    K 4.4 02/19/2020    CL 92 02/21/2020    CO2 25 02/21/2020    BUN 8 02/21/2020    CREATININE 0.7 02/21/2020    GLUCOSE 114 02/21/2020    CALCIUM 8.8 02/21/2020        Sx patho:   Right leg, below the knee amputation:  -  Acute necrotizing suppurative (gangrenous) inflammation of skin and  soft tissue at the distal tibia associate underlying osteomyelitis. Tyrone Bark and calcific atherosclerosis of the popliteal vessels. -  The proximal resection margin appears to be viable. Assessment:     S/p right BKA    Plan:      1:  Continue current plan of care. Post-op labs reviewed. Hyponatremia noted, remained stable overnight.  Patient with max temp of

## 2020-02-21 NOTE — PROGRESS NOTES
murmurs, rubs or gallops. Abdomen: Soft, non-tender, non-distended with normal bowel sounds. Musculoskeletal: Right BKA, clean. No clubbing, cyanosis or edema bilaterally. Skin: Skin color, texture, turgor normal.  No rashes or lesions. Neurologic:  Neurovascularly intact without any focal sensory/motor deficits. Cranial nerves: II-XII intact, grossly non-focal.  Psychiatric: Alert and oriented, thought content appropriate, normal insight  Capillary Refill: Brisk,< 3 seconds   Peripheral Pulses: +2 palpable, equal bilaterally     Labs:   Recent Labs     02/19/20  0901 02/20/20  1022   WBC 6.8 10.0   HGB 12.1* 11.6*   HCT 35.7* 34.5*    205     Recent Labs     02/19/20  0901 02/20/20  0704 02/21/20  0724   * 127* 128*   K 4.4 4.0 4.1   CL 97* 89* 92*   CO2 26 28 25   BUN 8 7 8   CREATININE 0.8 0.7* 0.7*   CALCIUM 9.3 9.4 8.8     No results for input(s): AST, ALT, BILIDIR, BILITOT, ALKPHOS in the last 72 hours. No results for input(s): INR in the last 72 hours. No results for input(s): Eugune Ran in the last 72 hours. Urinalysis:    No results found for: Vamshi Labella, BACTERIA, RBCUA, BLOODU, SPECGRAV, [address]    Radiology:  CT CHEST W CONTRAST   Final Result   1. No evidence of right hilar mass. 2. 3-4 mm solid bilateral pulmonary nodules bear attention on the next   imaging cycle. 3. 4.4 cm ascending thoracic aortic dilatation. XR CHEST STANDARD (2 VW)   Final Result   Possible mass versus adenopathy of the right hilum. Recommend further   evaluation with a CT of the chest with IV contrast.                 Assessment/Plan:    Active Hospital Problems    Diagnosis    History of amputation below knee, right (HCC) [Z89.511]     Hyponatremia- chronic suspect beer potomania EMR notes several Nx levels in the past 125-130  Patient is a regular beer drinker, and is on hydrochlorothiazide at home.  Hydrochlorothiazide needs to be stopped  - cortisol, aldosterone, thyroid stimulating hormone levels. Reviewed   Urine sodium- 98  - stable asymptomatic, would rec BNP X 1 week   - stop HCTZ        Hypertension  Resume lisinopril, without hydrochlorothiazide. No need for as needed antihypertensives as patient is otherwise asymptomatic.     Tibia abscess  Had right below the knee amputation  Follow-up per primary team  Infectious diseases also consulted     Alcohol abuse  No signs of withdrawal  Patient's sodium levels were unremarkable prior to admission.  Of course, limiting alcohol intake will always be helpful.     Discussed with the patient and wife, questions answered       DVT Prophylaxis: per primary team   Diet: DIET GENERAL;  Code Status: Full Code    PT/OT Eval Status: active and ongoing     Dispo - Per primary team    Alonzo Nazario, BRODIE - CNP

## 2020-02-21 NOTE — CONSULTS
cervical or supraclavicular adenopathy. Skin: Skin is warm and dry. No rash or nodules on the exposed extremities. Psychiatric: Normal mood and affect. Behavior is normal.  No anxiety. Neurologic: Alert, awake and oriented. PERRL. Speech fluent. No obvious focal deficit, detailed exam not performed    Results:  CBC:   Recent Labs     02/19/20  0901 02/20/20  1022   WBC 6.8 10.0   HGB 12.1* 11.6*   HCT 35.7* 34.5*   MCV 86.4 85.8    205     BMP:   Recent Labs     02/19/20  0901 02/20/20  0704 02/21/20  0724   * 127* 128*   K 4.4 4.0 4.1   CL 97* 89* 92*   CO2 26 28 25   BUN 8 7 8   CREATININE 0.8 0.7* 0.7*       Imaging:  I have reviewed radiology images personally. CT CHEST W CONTRAST   Final Result   1. No evidence of right hilar mass. 2. 3-4 mm solid bilateral pulmonary nodules bear attention on the next   imaging cycle. 3. 4.4 cm ascending thoracic aortic dilatation. XR CHEST STANDARD (2 VW)   Final Result   Possible mass versus adenopathy of the right hilum. Recommend further   evaluation with a CT of the chest with IV contrast.           Xr Chest Standard (2 Vw)    Result Date: 2/20/2020  EXAMINATION: TWO XRAY VIEWS OF THE CHEST 2/20/2020 10:45 am COMPARISON: None. HISTORY: ORDERING SYSTEM PROVIDED HISTORY: hyponatremia, mass workup TECHNOLOGIST PROVIDED HISTORY: Reason for exam:->hyponatremia, mass workup FINDINGS: There is suggestion of a possible mass adjacent to the right hilum, versus overlapping shadows with the mediastinum. Evidence of mass proceeded elsewhere by chest x-ray evaluation. No pleural effusion. There is eventration of the right diaphragm. Possible mass versus adenopathy of the right hilum.   Recommend further evaluation with a CT of the chest with IV contrast.     Ct Chest W Contrast    Result Date: 2/20/2020  EXAMINATION: CT OF THE CHEST WITH CONTRAST 2/20/2020 4:17 pm TECHNIQUE: CT of the chest was performed with the administration of intravenous Lovenox. I have examined the patient, reviewed labs, images and medical records. My impression and recommendations are as above. Discussed with the patient, his wife, nursing and Dr. Marga Mckay. The patient is due to undergo CT in 6 months and follow-up with Dr. Marga Mckay for his aneurysm. I have asked him to discuss with Dr. Marga Mckay about the lung nodules at follow-up. He can certainly see us in follow-up if he wishes to, but I do not believe short-term CT is necessary. Thank you for the consultation.       Electronically signed by:  Paul Lucero MD    2/21/2020    1:10 PM.

## 2020-02-21 NOTE — PROGRESS NOTES
Delivered walker to [facility] room.   Thanks for the referral.  Sandy Pacheco  2/21/2020

## 2020-02-21 NOTE — PROGRESS NOTES
Consult has been called to Cardiothoracic Surgery on 2/20/2020 at 7:46 PM     Jose David Denney  2/20/2020    \

## 2020-02-21 NOTE — CONSULTS
Department of Cardiovascular & Thoracic Surgery  History and Physical          DIAGNOSIS:  Ascending aortic aneurysm    CHIEF COMPLAINT:  none    History Obtained From:  patient, spouse, electronic medical record    HISTORY OF PRESENT ILLNESS:      The patient is a 60 y.o. male with significant past medical history of osteosarcoma of the right fibula. He has had multiple treatments of the right lower extremity that have led to below-knee amputation on the right side. 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 Who presents with a chest x-ray on admission that showed a prominent right hilum. This led to a CT scan of the chest which demonstrates a dilated ascending aorta for which I am asked to see the patient. He has no history of chest pain. No prior known history of cardiac or vascular disease. 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 Cholecalciferol (VITAMIN D3 PO), Take 1 capsule by mouth daily  gabapentin (NEURONTIN) 300 MG capsule, Take 600 mg by mouth 3 times daily.    lisinopril-hydrochlorothiazide AST 20 10/21/2019    ALT 20 10/21/2019     Hepatic Function Panel:    Lab Results   Component Value Date    ALKPHOS 68 10/21/2019    ALT 20 10/21/2019    AST 20 10/21/2019    PROT 7.0 2016    PROT 7.0 11/15/2010    BILITOT 0.8 10/21/2019    LABALBU 2.9 10/21/2019     2020 CXRAY:    Possible mass versus adenopathy of the right hilum.  Recommend further   evaluation with a CT of the chest with IV contrast.     2020 CT Chest scan:  I have independently reviewed and interpreted this study and based my surgical recommendations to the patient on those findings. I agree with the followin. No evidence of right hilar mass. 2. 3-4 mm solid bilateral pulmonary nodules bear attention on the next   imaging cycle. 3. 4.4 cm ascending thoracic aortic dilatation. ASSESSMENT AND PLAN:    25-year-old male admitted for treatment of his infected right BKA stump is incidentally found to have a dilated ascending aorta that currently measures 44 mm. He has no prior imaging studies for comparison. He has a history of hypertension and he does a lot of bench weight workout, lifting as much as 150 pounds. His heart rate has been consistently 100 bpm and blood pressure running well into the 552Y systolic. At 44 mm, his ascending aorta does not warrant surgical intervention at this time. He will require long-term follow-up with his next CT scan of the chest at 6 months. He also needs a transthoracic echo to rule out a bicuspid aortic valve as this carries different prognostic factors for the size of his ascending aorta vis-à-vis the risk of aortic dissection. With his tachycardia and hypertension he should be treated with beta-blockade for the long-term and he should curtail bench work in favor of single extremity maintenance of muscle tone. I have started Lopressor 25 mg twice daily. I have ordered a transthoracic echocardiogram to rule out bicuspid aortic valve.   I will repeat his CT scan of the

## 2020-02-21 NOTE — DISCHARGE SUMMARY
Department of Orthopedic Surgery  Nurse Practitioner   Discharge Summary    The Phill Borrero is a 60 y.o. male underwent below the knee amputation procedure without complication. Phill Borrero was admitted to the floor following His recovery in the PACU. Discharge Diagnosis  right BKA    Current Inpatient Medications    Current Facility-Administered Medications: metoprolol tartrate (LOPRESSOR) tablet 25 mg, 25 mg, Oral, BID  butalbital-acetaminophen-caffeine (FIORICET, ESGIC) per tablet 1 tablet, 1 tablet, Oral, Q4H PRN  lisinopril (PRINIVIL;ZESTRIL) tablet 10 mg, 10 mg, Oral, Daily  gabapentin (NEURONTIN) capsule 600 mg, 600 mg, Oral, TID  doxycycline hyclate (VIBRA-TABS) tablet 100 mg, 100 mg, Oral, 2 times per day  acetaminophen (TYLENOL) tablet 650 mg, 650 mg, Oral, Q4H PRN  sodium chloride flush 0.9 % injection 10 mL, 10 mL, Intravenous, 2 times per day  sodium chloride flush 0.9 % injection 10 mL, 10 mL, Intravenous, PRN  morphine (PF) injection 2 mg, 2 mg, Intravenous, Q2H PRN **OR** morphine (PF) injection 4 mg, 4 mg, Intravenous, Q2H PRN  docusate sodium (COLACE) capsule 100 mg, 100 mg, Oral, BID  ondansetron (ZOFRAN) injection 4 mg, 4 mg, Intravenous, Q6H PRN  enoxaparin (LOVENOX) injection 40 mg, 40 mg, Subcutaneous, Daily  oxyCODONE (ROXICODONE) immediate release tablet 5 mg, 5 mg, Oral, Q4H PRN **OR** oxyCODONE (ROXICODONE) immediate release tablet 10 mg, 10 mg, Oral, Q4H PRN  famotidine (PEPCID) tablet 20 mg, 20 mg, Oral, BID    Post-operatively the patients diet was advanced as tolerated and their incision was checked on POD #1. The incision is dressing in place, clean, dry and intact with no signs of infection. The patient remained neurovascularly intact in the lower extremity and had intact pulses distally. Patients calf remained soft and showed no evidence of DVT. The patient was able to move their leg and ankle/foot without any problems post-operatively.   Physical therapy and outlined in [place]. All efforts have been taken to prevent abuse potential and misuse of opioid medications including education, screening, and close clinical follow up.

## 2020-02-23 LAB
QUANTI TB GOLD PLUS: NEGATIVE
QUANTI TB1 MINUS NIL: 0 IU/ML (ref 0–0.34)
QUANTI TB2 MINUS NIL: 0 IU/ML (ref 0–0.34)
QUANTIFERON MITOGEN: 6.66 IU/ML
QUANTIFERON NIL: 0.02 IU/ML

## 2020-02-24 DIAGNOSIS — Z89.511 HISTORY OF AMPUTATION BELOW KNEE, RIGHT (HCC): ICD-10-CM

## 2020-02-24 NOTE — OP NOTE
[facility]                                OPERATIVE REPORT    PATIENT NAME: Nishi Cooley                     :        1960  MED REC NO:   [id number]                          ROOM:       [id number]  ACCOUNT NO:   [de-identified]                           ADMIT DATE: 2020  PROVIDER:     Divina Verduzco MD    DATE OF PROCEDURE:  2020    PREOPERATIVE DIAGNOSIS:  Right leg chronic wound, history of radiation  for sarcoma. OPERATION PERFORMED:  Right leg below knee amputation. SURGEON:  Divina Verduzco MD    COMPLICATIONS:  None. DRAINS AND TUBES:  None. ESTIMATED BLOOD LOSS:  100 mL. INDICATIONS FOR SURGERY:  This is a pleasant male with chronic  nonhealing wounds in his leg, history of radiation due to sarcoma. The  patient had been evaluated by Plastic Surgery [facility] Wound Care, had  continued to have open areas, and presents today for amputation. Risks,  benefits, and alternatives were discussed with the patient including  chronic pain and continued infection. We also had a serious discussion  about the fact that this may require above-the-knee amputation. He  consented to the procedure. OPERATIVE PROCEDURE:  The patient was taken to the operating room,  placed on the operating table under successful induction of general  anesthesia. A nonsterile tourniquet was placed on the right thigh. The  right leg was then prepped and draped in the usual fashion. Leg was  elevated, exsanguinated and the tourniquet was inflated. An incision  was made onto the medial base of the tibia where he had a large open  wound that was ellipsed out. We went down the medial base of the tibia  and then basically subperiosteally elevated the posterior compartments  and the anterior compartment off the tibia.   We resected the most  proximal part of the fibula, the remnant of it and then we did an  excision with an oscillating saw about a handbreadth below the tubercle. Once this was done, we had a large flap. There were significant open  areas into the necrotic wounds on the lateral part of the flap and then  one small area cut at distal posterior. We shaped the flap to cover the  stump nicely with good muscle coverage. We put the tourniquet down  and gained hemostasis. We irrigated and closed in layers. We did  have to leave one small necrotic area in the posterior part of the flap. There was no other way to orient the flap to remove all these areas. The wound was then irrigated and closed. Sterile dressing was applied. The patient was then taken from the operating room to Recovery where he  arrived in a stable condition.         Maureen Elizabeth MD    D: 02/23/2020 17:39:43       T: 02/23/2020 19:06:14     [email or web address]  Job#: [id number]     Doc#: [id number]    CC:

## 2020-03-02 ENCOUNTER — OFFICE VISIT (OUTPATIENT)
Dept: FAMILY MEDICINE CLINIC | Age: 60
End: 2020-03-02
Payer: COMMERCIAL

## 2020-03-02 VITALS
SYSTOLIC BLOOD PRESSURE: 155 MMHG | WEIGHT: 180 LBS | HEART RATE: 79 BPM | BODY MASS INDEX: 26.66 KG/M2 | TEMPERATURE: 96.5 F | DIASTOLIC BLOOD PRESSURE: 98 MMHG | OXYGEN SATURATION: 98 % | HEIGHT: 69 IN

## 2020-03-02 DIAGNOSIS — E87.1 HYPONATREMIA: ICD-10-CM

## 2020-03-02 LAB
ANION GAP SERPL CALCULATED.3IONS-SCNC: 15 MMOL/L (ref 3–16)
BUN BLDV-MCNC: 11 MG/DL (ref 7–20)
CALCIUM SERPL-MCNC: 10.5 MG/DL (ref 8.3–10.6)
CHLORIDE BLD-SCNC: 101 MMOL/L (ref 99–110)
CO2: 23 MMOL/L (ref 21–32)
CREAT SERPL-MCNC: 0.8 MG/DL (ref 0.8–1.3)
GFR AFRICAN AMERICAN: >60
GFR NON-AFRICAN AMERICAN: >60
GLUCOSE BLD-MCNC: 90 MG/DL (ref 70–99)
POTASSIUM SERPL-SCNC: 5.2 MMOL/L (ref 3.5–5.1)
SODIUM BLD-SCNC: 139 MMOL/L (ref 136–145)

## 2020-03-02 PROCEDURE — 99214 OFFICE O/P EST MOD 30 MIN: CPT | Performed by: FAMILY MEDICINE

## 2020-03-02 PROCEDURE — 1111F DSCHRG MED/CURRENT MED MERGE: CPT | Performed by: FAMILY MEDICINE

## 2020-03-02 PROCEDURE — G8427 DOCREV CUR MEDS BY ELIG CLIN: HCPCS | Performed by: FAMILY MEDICINE

## 2020-03-02 PROCEDURE — G8484 FLU IMMUNIZE NO ADMIN: HCPCS | Performed by: FAMILY MEDICINE

## 2020-03-02 PROCEDURE — G8419 CALC BMI OUT NRM PARAM NOF/U: HCPCS | Performed by: FAMILY MEDICINE

## 2020-03-02 PROCEDURE — 1036F TOBACCO NON-USER: CPT | Performed by: FAMILY MEDICINE

## 2020-03-02 PROCEDURE — 3017F COLORECTAL CA SCREEN DOC REV: CPT | Performed by: FAMILY MEDICINE

## 2020-03-02 RX ORDER — LISINOPRIL 20 MG/1
20 TABLET ORAL DAILY
Qty: 90 TABLET | Refills: 1 | Status: SHIPPED | OUTPATIENT
Start: 2020-03-02 | End: 2020-08-26

## 2020-03-02 NOTE — PROGRESS NOTES
Bharat Dan is a 60 y.o. male. HPI: Patient here for hospital follow-up, wife is in attendance  On February 18 he had a right BKA due to nonhealing ulcerations from an old childhood osteosarcoma  During the hospitalization his sodium was down to 128 and chest x-ray revealed possible nodules so he underwent a chest CT scan the chest CT scan showed a 4.4 cm dilated a sending aorta and some small pulmonary nodules echocardiogram was normal but also confirmed a dilated ascending aorta  Dr. Oriana Franco a cardiothoracic surgeon was consulted and he suggested repeating CT scan in 6 months  Meanwhile has been back to see his orthopedic specialist and his right stump is healing nicely he sees a specialist again this week there are no dressing changes going on at home  He is already been back to work  He is almost off all his pain medicine  Blood pressure medicine was changed   Because of hyponatremia he stopped his lisinopril HCT. Now he is on lisinopril 10 and metoprolol 25 twice daily  Having mild phantom pains  Echocardiogram was normal but did confirm the ascending aorta dilatation as well  Meds, vitamins and allergies reviewed with pt    ROS: No TIA's or unusual headaches, no dysphagia. No prolonged cough. No dyspnea or chest pain on exertion. No abdominal pain, change in bowel habits, black or bloody stools. No urinary tract symptoms. No new or unusual musculoskeletal symptoms. Prior to Visit Medications    Medication Sig Taking?  Authorizing Provider   lisinopril (PRINIVIL;ZESTRIL) 20 MG tablet Take 1 tablet by mouth daily Yes Sonam Garcia MD   enoxaparin (LOVENOX) 40 MG/0.4ML injection Inject 0.4 mLs into the skin daily Yes BRODIE Giron CNP   metoprolol tartrate (LOPRESSOR) 25 MG tablet Take 1 tablet by mouth 2 times daily Yes BRODIE Giron CNP   Cholecalciferol (VITAMIN D3 PO) Take 1 capsule by mouth daily Yes Historical Provider, MD   Multiple Vitamins-Minerals (CENTRUM SILVER daily  Return to office in 2 weeks    3 status post right BKA for osteosarcoma and nonhealing wounds  Osteomyelitis was seen during pathology  Follow-up with orthopedic specialist and begin prosthetic fitting    4 dilated a sending aorta  Repeat CT scan   In 6 months    #5 pulmonary nodules  Appears benign, repeat CT in 6 months    Spent 25 minutes with patient greater than 50% of time addressing hospital results, discussing blood pressure management, and encouraging him to stay positive

## 2020-03-16 ENCOUNTER — TELEPHONE (OUTPATIENT)
Dept: FAMILY MEDICINE CLINIC | Age: 60
End: 2020-03-16

## 2020-03-16 NOTE — TELEPHONE ENCOUNTER
Patient had leg amputated and was given metropropolal tartrate 25MG 2x per day. Wanting to know if prescription can be written.    [employer] [place] [phone]

## 2020-04-27 ENCOUNTER — TELEPHONE (OUTPATIENT)
Dept: FAMILY MEDICINE CLINIC | Age: 60
End: 2020-04-27

## 2020-04-27 RX ORDER — GABAPENTIN 300 MG/1
300 CAPSULE ORAL NIGHTLY
Qty: 90 CAPSULE | Refills: 1 | Status: SHIPPED | OUTPATIENT
Start: 2020-04-27 | End: 2020-09-15

## 2020-04-27 NOTE — TELEPHONE ENCOUNTER
I have refilled the gabapentin however, he should know that is usually taken regularly for neuropathic pain rather than only as needed like you would for a headache

## 2020-04-27 NOTE — TELEPHONE ENCOUNTER
Spoke with pt, he states that the Gabapentin is for nerve pain. He was seeing a provider at the infectious disease but is no longer seeing them. Pt states that he is only seeing Dr. Violeta Hunter and the surgeon who is only following up on leg amputation. Pt does not take them to often but likes having them in case for pain. He is out of the Gabapentin now.  Please advise

## 2020-09-11 ENCOUNTER — TELEPHONE (OUTPATIENT)
Dept: FAMILY MEDICINE CLINIC | Age: 60
End: 2020-09-11

## 2020-09-11 DIAGNOSIS — Z00.00 WELL ADULT EXAM: ICD-10-CM

## 2020-09-11 LAB
A/G RATIO: 2.6 (ref 1.1–2.2)
ALBUMIN SERPL-MCNC: 4.7 G/DL (ref 3.4–5)
ALP BLD-CCNC: 71 U/L (ref 40–129)
ALT SERPL-CCNC: 19 U/L (ref 10–40)
ANION GAP SERPL CALCULATED.3IONS-SCNC: 12 MMOL/L (ref 3–16)
AST SERPL-CCNC: 20 U/L (ref 15–37)
BASOPHILS ABSOLUTE: 0 K/UL (ref 0–0.2)
BASOPHILS RELATIVE PERCENT: 0.6 %
BILIRUB SERPL-MCNC: 0.5 MG/DL (ref 0–1)
BUN BLDV-MCNC: 15 MG/DL (ref 7–20)
CALCIUM SERPL-MCNC: 10.4 MG/DL (ref 8.3–10.6)
CHLORIDE BLD-SCNC: 104 MMOL/L (ref 99–110)
CO2: 24 MMOL/L (ref 21–32)
CREAT SERPL-MCNC: 1 MG/DL (ref 0.8–1.3)
EOSINOPHILS ABSOLUTE: 0.3 K/UL (ref 0–0.6)
EOSINOPHILS RELATIVE PERCENT: 5.4 %
GFR AFRICAN AMERICAN: >60
GFR NON-AFRICAN AMERICAN: >60
GLOBULIN: 1.8 G/DL
GLUCOSE BLD-MCNC: 99 MG/DL (ref 70–99)
HCT VFR BLD CALC: 42.7 % (ref 40.5–52.5)
HEMOGLOBIN: 14.4 G/DL (ref 13.5–17.5)
LYMPHOCYTES ABSOLUTE: 1.4 K/UL (ref 1–5.1)
LYMPHOCYTES RELATIVE PERCENT: 28.5 %
MCH RBC QN AUTO: 30 PG (ref 26–34)
MCHC RBC AUTO-ENTMCNC: 33.7 G/DL (ref 31–36)
MCV RBC AUTO: 89 FL (ref 80–100)
MONOCYTES ABSOLUTE: 0.5 K/UL (ref 0–1.3)
MONOCYTES RELATIVE PERCENT: 9.6 %
NEUTROPHILS ABSOLUTE: 2.8 K/UL (ref 1.7–7.7)
NEUTROPHILS RELATIVE PERCENT: 55.9 %
PDW BLD-RTO: 14.2 % (ref 12.4–15.4)
PLATELET # BLD: 239 K/UL (ref 135–450)
PMV BLD AUTO: 9.8 FL (ref 5–10.5)
POTASSIUM SERPL-SCNC: 4.4 MMOL/L (ref 3.5–5.1)
PROSTATE SPECIFIC ANTIGEN: 1.88 NG/ML (ref 0–4)
RBC # BLD: 4.8 M/UL (ref 4.2–5.9)
SODIUM BLD-SCNC: 140 MMOL/L (ref 136–145)
TOTAL PROTEIN: 6.5 G/DL (ref 6.4–8.2)
WBC # BLD: 4.9 K/UL (ref 4–11)

## 2020-09-12 LAB
HEPATITIS C ANTIBODY INTERPRETATION: NORMAL
HIV AG/AB: NORMAL
HIV ANTIGEN: NORMAL
HIV-1 ANTIBODY: NORMAL
HIV-2 AB: NORMAL

## 2020-09-15 ENCOUNTER — OFFICE VISIT (OUTPATIENT)
Dept: FAMILY MEDICINE CLINIC | Age: 60
End: 2020-09-15
Payer: COMMERCIAL

## 2020-09-15 VITALS
BODY MASS INDEX: 29.33 KG/M2 | SYSTOLIC BLOOD PRESSURE: 148 MMHG | HEART RATE: 85 BPM | WEIGHT: 198 LBS | HEIGHT: 69 IN | TEMPERATURE: 97.3 F | DIASTOLIC BLOOD PRESSURE: 85 MMHG | OXYGEN SATURATION: 97 %

## 2020-09-15 PROCEDURE — G8419 CALC BMI OUT NRM PARAM NOF/U: HCPCS | Performed by: FAMILY MEDICINE

## 2020-09-15 PROCEDURE — 1036F TOBACCO NON-USER: CPT | Performed by: FAMILY MEDICINE

## 2020-09-15 PROCEDURE — 99213 OFFICE O/P EST LOW 20 MIN: CPT | Performed by: FAMILY MEDICINE

## 2020-09-15 PROCEDURE — 3017F COLORECTAL CA SCREEN DOC REV: CPT | Performed by: FAMILY MEDICINE

## 2020-09-15 PROCEDURE — G8427 DOCREV CUR MEDS BY ELIG CLIN: HCPCS | Performed by: FAMILY MEDICINE

## 2020-09-15 NOTE — PROGRESS NOTES
edema  NEURO: Alert and oriented ×3      Lab Results   Component Value Date     09/11/2020    K 4.4 09/11/2020     09/11/2020    CO2 24 09/11/2020    BUN 15 09/11/2020    CREATININE 1.0 09/11/2020    GLUCOSE 99 09/11/2020    CALCIUM 10.4 09/11/2020    PROT 6.5 09/11/2020    LABALBU 4.7 09/11/2020    BILITOT 0.5 09/11/2020    ALKPHOS 71 09/11/2020    AST 20 09/11/2020    ALT 19 09/11/2020    LABGLOM >60 09/11/2020    GFRAA >60 09/11/2020    AGRATIO 2.6 (H) 09/11/2020    GLOB 1.8 09/11/2020     Lab Results   Component Value Date    WBC 4.9 09/11/2020    HGB 14.4 09/11/2020    HCT 42.7 09/11/2020    MCV 89.0 09/11/2020     09/11/2020     Lab Results   Component Value Date    PSA 1.88 09/11/2020    PSA 1.5 09/20/2016    PSA 1.4 11/15/2010     Lab Results   Component Value Date    CHOL 142 02/20/2020    CHOL 255 (H) 09/20/2016    CHOL 217 (H) 09/20/2016     Lab Results   Component Value Date    TRIG 77 02/20/2020    TRIG 305 (H) 09/20/2016    TRIG 206 (H) 11/15/2010     Lab Results   Component Value Date    HDL 45 02/20/2020    HDL 38 (L) 09/20/2016    HDL 51 11/15/2010     Lab Results   Component Value Date    LDLCALC 82 02/20/2020    LDLCALC 156 (H) 09/20/2016    LDLCALC 117 11/15/2010     Lab Results   Component Value Date    LABVLDL 15 02/20/2020     Lab Results   Component Value Date    CHOLHDLRATIO 6.7 (H) 09/20/2016    CHOLHDLRATIO 4.1 11/15/2010     ASSESSMENT/PLAN:  #1 htn-running high today  Increase lisinopril to 30 mg and observe blood pressure at home    #2 right bka  May well need a prosthetic adjustment, follow-up with prosthetist    #3 imm  Urged him to get flu shot, shingles shot and upcoming tetanus shot in November.   He will check with his insurance and come back for vaccinations    Spent 25 minutes with patient greater than 50% time discussing blood pressure treatment options, urging him to get his vaccinations, and reviewing his labs

## 2020-09-17 NOTE — PROGRESS NOTES
Order placed in epic for a surveillance pt due for ct chest w/o contrast for ascending aortic aneurysm.   Beto Murdock

## 2020-09-22 ENCOUNTER — TELEPHONE (OUTPATIENT)
Dept: FAMILY MEDICINE CLINIC | Age: 60
End: 2020-09-22

## 2020-09-22 RX ORDER — LISINOPRIL 30 MG/1
30 TABLET ORAL DAILY
Qty: 90 TABLET | Refills: 1 | Status: SHIPPED | OUTPATIENT
Start: 2020-09-22 | End: 2020-12-09 | Stop reason: SDUPTHER

## 2020-09-22 NOTE — TELEPHONE ENCOUNTER
Pharmacy has lisinopril 30mg, wife called to see if you want to change it?   If so please send in new rx for 30mg

## 2020-11-03 ENCOUNTER — NURSE ONLY (OUTPATIENT)
Dept: FAMILY MEDICINE CLINIC | Age: 60
End: 2020-11-03
Payer: COMMERCIAL

## 2020-11-03 PROCEDURE — 90471 IMMUNIZATION ADMIN: CPT | Performed by: FAMILY MEDICINE

## 2020-11-03 PROCEDURE — 90750 HZV VACC RECOMBINANT IM: CPT | Performed by: FAMILY MEDICINE

## 2020-11-04 ENCOUNTER — TELEPHONE (OUTPATIENT)
Dept: CARDIOTHORACIC SURGERY | Age: 60
End: 2020-11-04

## 2020-11-04 NOTE — TELEPHONE ENCOUNTER
Pt has not yet completed his annual CT scan for AAA.  Jenni Guzman requesting pt call back to schedule testing.  Angélica Parr

## 2020-11-10 ENCOUNTER — TELEPHONE (OUTPATIENT)
Dept: CARDIOTHORACIC SURGERY | Age: 60
End: 2020-11-10

## 2020-11-10 NOTE — TELEPHONE ENCOUNTER
[facility] requesting call back to sched CT chest w/o to assess an ascending aortic aneurysm.   Doyal Matter

## 2020-11-20 ENCOUNTER — TELEPHONE (OUTPATIENT)
Dept: CARDIOTHORACIC SURGERY | Age: 60
End: 2020-11-20

## 2020-11-20 NOTE — TELEPHONE ENCOUNTER
Cameron Lepe Prior Auth for CT Chest w /o contrast - states was denied; states insurance will approve CT with contrast. CT Chest with contrast has been scheduled for . Dr Manjeet Harris until 1 Dec; will review w/dr rojas at that time and schedule CT Chest accordingly. Fax orders for CT Chest to [phone] [facility] fax: [phone] w/pt at length; aware of all above   [facility] central [phone]- pt aware to call to cx CT w/contrast for  - will re evaluate w/dr rojas and schedule as per dr mensah. José Miguel aware of all.

## 2020-12-08 ENCOUNTER — TELEPHONE (OUTPATIENT)
Dept: CARDIOTHORACIC SURGERY | Age: 60
End: 2020-12-08

## 2020-12-08 ENCOUNTER — TELEPHONE (OUTPATIENT)
Dept: FAMILY MEDICINE CLINIC | Age: 60
End: 2020-12-08

## 2020-12-08 NOTE — TELEPHONE ENCOUNTER
Patient's wife states that she and her  had a rapid covid test done on 12/1 and it was negative. Patient's employer is requesting he have an antibody test to rule out Covid. Does he need an order, where does he go and how much does it cost?  Told his wife to contact their insurance to see if it is covered. Please advise.

## 2020-12-08 NOTE — TELEPHONE ENCOUNTER
I left a message for the patient regarding his CT scan chest ordered by Dr. Kasey Jackson for surveillance of his ascending aortic aneurysm. This had been previously scheduled, but the patient's insurance would not approve the procedure unless contrast is used. Dr. Kasey Jackson agreed to this, even though he does not need contrast to see the size of the aneurysm.   I asked the patient to contact our office to discuss scheduling of the test.

## 2020-12-09 ENCOUNTER — TELEPHONE (OUTPATIENT)
Dept: FAMILY MEDICINE CLINIC | Age: 60
End: 2020-12-09

## 2020-12-09 ENCOUNTER — TELEPHONE (OUTPATIENT)
Dept: CARDIOTHORACIC SURGERY | Age: 60
End: 2020-12-09

## 2020-12-09 RX ORDER — LISINOPRIL 30 MG/1
30 TABLET ORAL DAILY
Qty: 90 TABLET | Refills: 2 | Status: SHIPPED
Start: 2020-12-09 | End: 2021-08-31 | Stop reason: ALTCHOICE

## 2020-12-09 NOTE — TELEPHONE ENCOUNTER
Patient returned my call regarding his CT scan ordered by Dr. Jaime Cheek. Patient would like it scheduled at [facility] for CT scan chest with contrast for surveillance of his ascending aortic aneurysm. The scan is scheduled on Wednesday, December 16, 2020. He is to report at 4:00 pm, blood work to be drawn, scan at 5:00 pm, NPO for four hours prior. The patient wrote down the instructions and had no questions. He has our office telephone number.

## 2020-12-09 NOTE — TELEPHONE ENCOUNTER
Medication:   Requested Prescriptions     Pending Prescriptions Disp Refills    lisinopril (PRINIVIL;ZESTRIL) 30 MG tablet 90 tablet 1     Sig: Take 1 tablet by mouth daily       Last Filled:  9/22/2020 #90 1rf    Patient Phone Number: [phone] (home)     Last appt: 9/15/2020   Next appt: 2/3/2021    Lab Results   Component Value Date     09/11/2020    K 4.4 09/11/2020     09/11/2020    CO2 24 09/11/2020    BUN 15 09/11/2020    CREATININE 1.0 09/11/2020    GLUCOSE 99 09/11/2020    CALCIUM 10.4 09/11/2020    PROT 6.5 09/11/2020    LABALBU 4.7 09/11/2020    BILITOT 0.5 09/11/2020    ALKPHOS 71 09/11/2020    AST 20 09/11/2020    ALT 19 09/11/2020    LABGLOM >60 09/11/2020    GFRAA >60 09/11/2020    AGRATIO 2.6 (H) 09/11/2020    GLOB 1.8 09/11/2020

## 2020-12-10 RX ORDER — LISINOPRIL AND HYDROCHLOROTHIAZIDE 20; 12.5 MG/1; MG/1
TABLET ORAL
Qty: 135 TABLET | Refills: 3 | Status: SHIPPED | OUTPATIENT
Start: 2020-12-10 | End: 2021-10-04 | Stop reason: SDUPTHER

## 2020-12-10 NOTE — TELEPHONE ENCOUNTER
Pt said his bp is still running high. He would like the following called into [facility]:    lisinopril (PRINIVIL;ZESTRIL) 20 MG tablet w/hydrochlorothiazide.     Pharmacy:  [facility]

## 2020-12-14 ENCOUNTER — TELEPHONE (OUTPATIENT)
Dept: FAMILY MEDICINE CLINIC | Age: 60
End: 2020-12-14

## 2020-12-14 RX ORDER — CEPHALEXIN 500 MG/1
500 CAPSULE ORAL 4 TIMES DAILY
Qty: 28 CAPSULE | Refills: 0 | Status: SHIPPED | OUTPATIENT
Start: 2020-12-14 | End: 2020-12-21

## 2020-12-14 NOTE — TELEPHONE ENCOUNTER
I sent an antibiotic in for him but it would be wise for him to come to our office so we could get a wound culture before starting antibiotics.   I believe Dr. Kelsea Fontenot has an opening this evening when he could do a culture and start him on antibiotics

## 2020-12-14 NOTE — TELEPHONE ENCOUNTER
Left msg to call back. Dr Olivia Zamora had a 6:30 pm but he did not want to stay late tonight and wants patient to come in 2:45 .  Please schedule with Dr Olivia Zamora today at 2:45pm

## 2020-12-14 NOTE — TELEPHONE ENCOUNTER
----- Message from Lei Alfaro sent at 12/12/2020  8:34 AM EST -----  Subject: Message to Provider    QUESTIONS  Information for Provider? Pt had an amputation in Feb 20 that he believes   is infected   red   oozing   fluids leaking from pin hole in the center x2-3 days he is requesting a   script for an antibiotic  ---------------------------------------------------------------------------  --------------  CALL BACK INFO  What is the best way for the office to contact you? OK to leave message on   voicemail  Preferred Call Back Phone Number? [phone]  ---------------------------------------------------------------------------  --------------  SCRIPT ANSWERS  Relationship to Patient?  Self

## 2020-12-15 ENCOUNTER — TELEPHONE (OUTPATIENT)
Dept: CARDIOTHORACIC SURGERY | Age: 60
End: 2020-12-15

## 2020-12-15 NOTE — TELEPHONE ENCOUNTER
I left a message on the patient's cell phone regarding his CT scan chest with contrast ordered by Dr. Emi Heath for surveillance of his ascending aortic aneurysm. I spoke with [address] today to get the scan authorized. The nurse reviewer, Perry Hamlin, stated the issue with the CT denial is not because of the contrast.  The issue is the aneurysm only measure 4.4 cm and their guidelines state it should be a yearly scan not in six months. I did tell her the reasoning behind the six month scan. The case is going for Physician review. I will call the patient after the decision from the insurance company comes thru. At this time we are cancelling the study.

## 2020-12-22 ENCOUNTER — TELEPHONE (OUTPATIENT)
Dept: CARDIOTHORACIC SURGERY | Age: 60
End: 2020-12-22

## 2020-12-22 NOTE — TELEPHONE ENCOUNTER
I spoke with  Shashi regarding the [facility] Moss denial for his surveillance CT scan ordered by Dr. Karn Landau. The denial is scanned into the Media tab. I also discussed this with Dr. Karn Landau. [facility] denied because the aneurysm is only 4.3 cm and they recommend yearly CT scans. We will move the patient to have the CT scan in February 2020.   Dr. Karn Landau would like CT scan chest with no contrast.

## 2021-01-05 NOTE — TELEPHONE ENCOUNTER
May use a same-day appointment with me on Monday, February 17 Erythromycin Counseling:  I discussed with the patient the risks of erythromycin including but not limited to GI upset, allergic reaction, drug rash, diarrhea, increase in liver enzymes, and yeast infections.

## 2021-02-03 ENCOUNTER — NURSE ONLY (OUTPATIENT)
Dept: FAMILY MEDICINE CLINIC | Age: 61
End: 2021-02-03
Payer: COMMERCIAL

## 2021-02-03 DIAGNOSIS — Z23 NEED FOR VACCINATION: Primary | ICD-10-CM

## 2021-02-03 PROCEDURE — 90471 IMMUNIZATION ADMIN: CPT | Performed by: FAMILY MEDICINE

## 2021-02-03 PROCEDURE — 90750 HZV VACC RECOMBINANT IM: CPT | Performed by: FAMILY MEDICINE

## 2021-02-12 ENCOUNTER — TELEPHONE (OUTPATIENT)
Dept: FAMILY MEDICINE CLINIC | Age: 61
End: 2021-02-12

## 2021-02-12 DIAGNOSIS — Z23 NEED FOR VACCINATION: Primary | ICD-10-CM

## 2021-02-12 NOTE — TELEPHONE ENCOUNTER
Pt is requesting tetanus vaccine for MA schedule okay to give him.  Pt is scheduled to come in on tuesday

## 2021-02-17 ENCOUNTER — NURSE ONLY (OUTPATIENT)
Dept: FAMILY MEDICINE CLINIC | Age: 61
End: 2021-02-17
Payer: COMMERCIAL

## 2021-02-17 ENCOUNTER — TELEPHONE (OUTPATIENT)
Dept: CARDIOTHORACIC SURGERY | Age: 61
End: 2021-02-17

## 2021-02-17 DIAGNOSIS — Z23 NEED FOR VACCINATION: ICD-10-CM

## 2021-02-17 PROCEDURE — 90471 IMMUNIZATION ADMIN: CPT | Performed by: FAMILY MEDICINE

## 2021-02-17 PROCEDURE — 90714 TD VACC NO PRESV 7 YRS+ IM: CPT | Performed by: FAMILY MEDICINE

## 2021-04-12 ENCOUNTER — TELEPHONE (OUTPATIENT)
Dept: CARDIOTHORACIC SURGERY | Age: 61
End: 2021-04-12

## 2021-04-12 NOTE — TELEPHONE ENCOUNTER
Spoke with patient regarding schedule of surveillance CT chest without contrast on 4/19/2021 at 9:20 am with arrival time of 9:00 am at [facility] [place] for assessment of his ascending aortic aneurysm. Patient instructed not to wear any metal items and to call our office with any questions or concerns.

## 2021-04-19 ENCOUNTER — TELEPHONE (OUTPATIENT)
Dept: CARDIOTHORACIC SURGERY | Age: 61
End: 2021-04-19

## 2021-04-19 ENCOUNTER — HOSPITAL ENCOUNTER (OUTPATIENT)
Dept: CT IMAGING | Age: 61
Discharge: HOME | End: 2021-04-19
Payer: COMMERCIAL

## 2021-04-19 DIAGNOSIS — I71.21 ASCENDING AORTIC ANEURYSM: ICD-10-CM

## 2021-04-19 PROCEDURE — 71250 CT THORAX DX C-: CPT

## 2021-04-19 NOTE — TELEPHONE ENCOUNTER
Patient called from the hospital as he is there to complete surveillance CT of the chest. According to his insurance company, they requested the scan be performed with contrast and per Dr. Gibson Heads, the scan did not require contrast medium and the patient preferred to have it without.

## 2021-04-21 ENCOUNTER — TELEPHONE (OUTPATIENT)
Dept: CARDIOTHORACIC SURGERY | Age: 61
End: 2021-04-21

## 2021-04-21 NOTE — TELEPHONE ENCOUNTER
CT chest w/o contrast was reviewed by Dr. Emery Enriquez. Minimal change noted. Repeat same testing in one year. Pt notified. No additional questions.   Mary Antonio

## 2021-06-14 NOTE — TELEPHONE ENCOUNTER
Medication:   Requested Prescriptions     Pending Prescriptions Disp Refills    metoprolol tartrate (LOPRESSOR) 25 MG tablet [Pharmacy Med Name: METOPROLOL TARTRATE 25 MG TAB] 60 tablet 1     Sig: TAKE ONE TABLET BY MOUTH TWICE A DAY       Last Filled:  3/8/21 #60, 2 RF     Patient Phone Number: [phone] (home)     Last appt: 9/15/2020 HTN, blood pressure check   Next appt: Visit date not found    Lab Results   Component Value Date     09/11/2020    K 4.4 09/11/2020     09/11/2020    CO2 24 09/11/2020    BUN 15 09/11/2020    CREATININE 1.0 09/11/2020    GLUCOSE 99 09/11/2020    CALCIUM 10.4 09/11/2020    PROT 6.5 09/11/2020    LABALBU 4.7 09/11/2020    BILITOT 0.5 09/11/2020    ALKPHOS 71 09/11/2020    AST 20 09/11/2020    ALT 19 09/11/2020    LABGLOM >60 09/11/2020    GFRAA >60 09/11/2020    AGRATIO 2.6 (H) 09/11/2020    GLOB 1.8 09/11/2020

## 2021-06-18 NOTE — TELEPHONE ENCOUNTER
Medication:   Requested Prescriptions     Pending Prescriptions Disp Refills    metoprolol tartrate (LOPRESSOR) 25 MG tablet [Pharmacy Med Name: METOPROLOL TARTRATE 25 MG TAB] 60 tablet 1     Sig: TAKE ONE TABLET BY MOUTH TWICE A DAY       Last Filled:  06/14/2021 #180 0rf     Patient Phone Number: [phone] (home)     Last appt: 9/15/2020   Next appt: Visit date not found    Lab Results   Component Value Date     09/11/2020    K 4.4 09/11/2020     09/11/2020    CO2 24 09/11/2020    BUN 15 09/11/2020    CREATININE 1.0 09/11/2020    GLUCOSE 99 09/11/2020    CALCIUM 10.4 09/11/2020    PROT 6.5 09/11/2020    LABALBU 4.7 09/11/2020    BILITOT 0.5 09/11/2020    ALKPHOS 71 09/11/2020    AST 20 09/11/2020    ALT 19 09/11/2020    LABGLOM >60 09/11/2020    GFRAA >60 09/11/2020    AGRATIO 2.6 (H) 09/11/2020    GLOB 1.8 09/11/2020

## 2021-08-31 ENCOUNTER — OFFICE VISIT (OUTPATIENT)
Dept: DERMATOLOGY | Age: 61
End: 2021-08-31
Payer: COMMERCIAL

## 2021-08-31 VITALS — TEMPERATURE: 97.4 F

## 2021-08-31 DIAGNOSIS — D22.9 BENIGN NEVUS: ICD-10-CM

## 2021-08-31 DIAGNOSIS — L57.0 KERATOSIS, ACTINIC: Primary | ICD-10-CM

## 2021-08-31 DIAGNOSIS — L57.0 ACTINIC KERATOSIS TREATED WITH TOPICAL FLUOROURACIL (5FU): ICD-10-CM

## 2021-08-31 PROCEDURE — 99214 OFFICE O/P EST MOD 30 MIN: CPT | Performed by: DERMATOLOGY

## 2021-08-31 PROCEDURE — 17000 DESTRUCT PREMALG LESION: CPT | Performed by: DERMATOLOGY

## 2021-08-31 PROCEDURE — 17003 DESTRUCT PREMALG LES 2-14: CPT | Performed by: DERMATOLOGY

## 2021-08-31 RX ORDER — FLUOROURACIL 50 MG/G
CREAM TOPICAL
Qty: 40 G | Refills: 0 | Status: SHIPPED
Start: 2021-08-31 | End: 2021-10-04 | Stop reason: CLARIF

## 2021-08-31 NOTE — PROGRESS NOTES
[facility]) Dermatology  Daniel Essex, M.D.  [phone]       Kalpana Part  1960    63 y.o. male     Date of Visit: 8/31/2021    Chief Complaint:   Chief Complaint   Patient presents with    Lesion(s)     upper body skin exam,         I was asked to see this patient by Dr. Pruitt ref. provider found. History of Present Illness:  1. 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 Increasing number of actinic keratoses across his forehead, temples, cheeks-increasing in size and number. Dry but nontender. Not itching, bleeding. Multiple nevi. Stable in size, shape, color. Has not noticed any new or changing pigmented lesions. Is wearing long sleeves and hats more frequently than he has in the past.    Skin history:  No previous personal history of skin cancer. History of extensive sun exposure- has a house on [facility] [facility] where he spends most weekends in the summer. Sister with a history of nonmelanoma skin cancer    Review of Systems:  Constitutional: Reports general sense of well-being       Past Medical History, Surgical History, Family History, Medications and Allergies reviewed.     Social History:   Social History     Socioeconomic History    Marital status:      Spouse name: Bridget Freedman Number of children: 2    Years of education: Not on file    Highest education level: Not on file   Occupational History    Not on file   Tobacco Use    Smoking status: Never Smoker    Smokeless tobacco: Never Used   Vaping Use    Vaping Use: Never used   Substance and Sexual Activity    Alcohol use: Yes     Comment: 2-8 drinks per week    Drug use: Not Currently    Sexual activity: Not on file   Other Topics Concern    Not on file   Social History Narrative    Not on file     Social Determinants of Health     Financial Resource Strain:     Difficulty of Paying Living Expenses:    Food Insecurity:     Worried About Running Out of [employer] in the Last Year:    [address] in the Last Year:    Transportation Needs:     Lack of Transportation (Medical):  Lack of Transportation (Non-Medical):    Physical Activity:     Days of Exercise per Week:     Minutes of Exercise per Session:    Stress:     Feeling of Stress :    Social Connections:     Frequency of Communication with Friends and Family:     Frequency of Social Gatherings with Friends and Family:     Attends Jewish Services:     Active Member of Clubs or Organizations:     Attends Club or Organization Meetings:     Marital Status:    Intimate Partner Violence:     Fear of Current or Ex-Partner:     Emotionally Abused:     Physically Abused:     Sexually Abused:        Physical Examination       -General: Well-appearing, NAD  1. Well-developed well-nourished-waist up skin exam  Gritty erythematous papules forehead, temples, cheeks, top of nose, vertex of scalp. Also more discrete scattered gritty erythematous papules dorsal forearms, chest  Scattered benign nevi back  Prosthesis right leg    Assessment and Plan     1. Keratosis, actinic -5-forearms and chest lesion(s) treated w/ liquid nitrogen. Edu re: risk of blister formation, discomfort, scar, hypopigmentation. Discussed wound care. 2. Actinic keratosis treated with topical fluorouracil (5FU)-Efudex 5% cream twice daily for 2 weeks forehead, temples, cheeks, top of ears, top of nose, vertex of scalp. Reviewed side effects, contraindications, proper application. Discussed strict sun avoidance   3.  Benign nevus - Benign acquired melanocytic nevi  -Recommend monthly self skin exams   -Educated regarding the ABCDEs of melanoma detection   -Call for any new/changing moles or concerning lesions  -Reviewed sun protective behavior -- sun avoidance during the peak hours of the day, sun-protective clothing (including hat and sunglasses), sunscreen use (water resistant, broad spectrum, SPF at least 30, need for reapplication every 2 to 3 hours), avoidance of tanning beds

## 2021-10-04 ENCOUNTER — OFFICE VISIT (OUTPATIENT)
Dept: FAMILY MEDICINE CLINIC | Age: 61
End: 2021-10-04
Payer: COMMERCIAL

## 2021-10-04 VITALS
WEIGHT: 201 LBS | OXYGEN SATURATION: 97 % | DIASTOLIC BLOOD PRESSURE: 90 MMHG | BODY MASS INDEX: 29.77 KG/M2 | SYSTOLIC BLOOD PRESSURE: 155 MMHG | HEIGHT: 69 IN | HEART RATE: 73 BPM

## 2021-10-04 DIAGNOSIS — I10 ESSENTIAL HYPERTENSION: ICD-10-CM

## 2021-10-04 DIAGNOSIS — I10 PRIMARY HYPERTENSION: Primary | ICD-10-CM

## 2021-10-04 PROCEDURE — 99213 OFFICE O/P EST LOW 20 MIN: CPT | Performed by: FAMILY MEDICINE

## 2021-10-04 RX ORDER — LISINOPRIL AND HYDROCHLOROTHIAZIDE 20; 12.5 MG/1; MG/1
TABLET ORAL
Qty: 135 TABLET | Refills: 3 | Status: SHIPPED | OUTPATIENT
Start: 2021-10-04 | End: 2022-09-26 | Stop reason: SDUPTHER

## 2021-10-04 SDOH — ECONOMIC STABILITY: FOOD INSECURITY: WITHIN THE PAST 12 MONTHS, THE FOOD YOU BOUGHT JUST DIDN'T LAST AND YOU DIDN'T HAVE MONEY TO GET MORE.: NEVER TRUE

## 2021-10-04 SDOH — ECONOMIC STABILITY: FOOD INSECURITY: WITHIN THE PAST 12 MONTHS, YOU WORRIED THAT YOUR FOOD WOULD RUN OUT BEFORE YOU GOT MONEY TO BUY MORE.: NEVER TRUE

## 2021-10-04 ASSESSMENT — PATIENT HEALTH QUESTIONNAIRE - PHQ9
2. FEELING DOWN, DEPRESSED OR HOPELESS: 0
SUM OF ALL RESPONSES TO PHQ9 QUESTIONS 1 & 2: 0
1. LITTLE INTEREST OR PLEASURE IN DOING THINGS: 0
SUM OF ALL RESPONSES TO PHQ QUESTIONS 1-9: 0

## 2021-10-04 ASSESSMENT — SOCIAL DETERMINANTS OF HEALTH (SDOH): HOW HARD IS IT FOR YOU TO PAY FOR THE VERY BASICS LIKE FOOD, HOUSING, MEDICAL CARE, AND HEATING?: NOT HARD AT ALL

## 2021-10-04 NOTE — PROGRESS NOTES
Adelina Burden is a 57 y.o. male. HPI:here for blood pressure recheck  Brings his cuff that he uses at home which has recorded numerous normal readings upon review  Readings are high here in the office about his cuff correlates with that as well also we think he has whitecoat syndrome  Has been working out weight is up mainly to more muscle mass  Does not do much cardio  Hoping to retire soon  Hesitant for any further vaccines including Covid or flu  Meds, vitamins and allergies reviewed with pt    ROS: No TIA's or unusual headaches, no dysphagia. No prolonged cough. No dyspnea or chest pain on exertion. No abdominal pain, change in bowel habits, black or bloody stools. No urinary tract symptoms. No new or unusual musculoskeletal symptoms. Prior to Visit Medications    Medication Sig Taking? Authorizing Provider   fluorouracil (EFUDEX) 5 % cream Apply topically 2 times daily for 10 days before appointment to forehead, temples, cheeks, top of nose, top of ears, top of head Yes Stanley Andrews MD   metoprolol tartrate (LOPRESSOR) 25 MG tablet TAKE ONE TABLET BY MOUTH TWICE A DAY Yes Aura Goldstein MD   lisinopril-hydroCHLOROthiazide (PRINZIDE;ZESTORETIC) 20-12.5 MG per tablet Pt takes 1.5 tabs daily Yes Aura Goldstein MD   Cholecalciferol (VITAMIN D3 PO) Take 1 capsule by mouth daily Yes Historical Provider, MD   Multiple Vitamins-Minerals (CENTRUM SILVER PO) Take  by mouth. Yes Historical Provider, MD   Ascorbic Acid (VITAMIN C) 500 MG CHEW Take  by mouth. Yes Historical Provider, MD   aspirin 81 MG tablet Take 81 mg by mouth daily.  Yes Historical Provider, MD       Past Medical History:   Diagnosis Date    [facility]    Hyperlipidemia     Hyperlipidemia 12/18/2019    Hyperlipidemia 12/18/2019    Hypertension     Hyperuricemia 12/18/2019    Hyperuricemia 12/18/2019    MDRO (multiple drug resistant organisms) resistance     MRSA right leg    [facility]       Social History     Tobacco Use    Smoking status: Never Smoker    Smokeless tobacco: Never Used   Vaping Use    Vaping Use: Never used   Substance Use Topics    Alcohol use: Yes     Comment: 2-8 drinks per week    Drug use: Not Currently       Family History   Problem Relation Age of Onset    Breast Cancer Mother     Hypertension Father     Diabetes Maternal Grandfather        Allergies   Allergen Reactions    Neosporin [Neomycin-Polymyxin-Gramicidin] Rash       OBJECTIVE:  BP (!) 148/94   Pulse 73   Ht 5' 9\" (1.753 m)   Wt 201 lb (91.2 kg)   SpO2 97%   BMI 29.68 kg/m²   GEN:  in NAD  NECK:  Supple without adenopathy. no bruit  CV:  Regular rate and rhythm, S1 and S2 normal, no murmurs, clicks  PULM:  Chest is clear, no wheezing ,  symmetric air entry throughout both lung fields.   EXT: No rash, 1+ pitting left ankleedema  NEURO: nonfocal    ASSESSMENT/PLAN:  htn - white  coat  Refill meds  Labs due    Declines covid, flu    S/p right leg BKA-prosthetic is functioning nicely

## 2021-11-15 ENCOUNTER — TELEPHONE (OUTPATIENT)
Dept: FAMILY MEDICINE CLINIC | Age: 61
End: 2021-11-15

## 2021-11-15 NOTE — TELEPHONE ENCOUNTER
----- Message from Slime De Leon sent at 11/15/2021 12:36 PM EST -----  Subject: Referral Request    QUESTIONS   Reason for referral request? patient is requesting order for antibodies   treatment-please call him to discuss   Has the physician seen you for this condition before? No   Preferred Specialist (if applicable)? Do you already have an appointment scheduled? No  Additional Information for Provider?   ---------------------------------------------------------------------------  --------------  CALL BACK INFO  What is the best way for the office to contact you? OK to leave message on   voicemail  Preferred Call Back Phone Number?  [phone]

## 2022-03-25 DIAGNOSIS — I71.20 THORACIC AORTIC ANEURYSM WITHOUT RUPTURE: Primary | ICD-10-CM

## 2022-03-25 NOTE — PROGRESS NOTES
CT chest w/o ordered for surveillance purposes per Dr. Celestine Arroyo. Will give to [facility] w/ [facility] to schedule.

## 2022-04-13 ENCOUNTER — TELEPHONE (OUTPATIENT)
Dept: CARDIOTHORACIC SURGERY | Age: 62
End: 2022-04-13

## 2022-04-13 NOTE — TELEPHONE ENCOUNTER
Spoke with patient regarding schedule of updated CT chest without contrast for assessment of his ascending aortic aneurysm. Patient informed me he has different insurance and we are not providers on his plan. He wishes to have us take him out of surveillance and he will follow up with someone in his network.

## 2022-08-21 NOTE — PROGRESS NOTES
[facility]) Dermatology  Cliff Ruiz M.D.  [phone]       Sawyer Starr  1960    60 y.o. male     Date of Visit: 5/8/2019    Chief Complaint:   Chief Complaint   Patient presents with    Skin Lesion        I was asked to see this patient by Dr. Noah Soto. History of Present Illness:  1. Total body skin exam    No previous personal history of skin cancer. History of extensive sun exposure- has a house on [facility] where he spends most weekends in the summer. Sister with a history of nonmelanoma skin cancer    Increasing number of actinic keratoses on his face-scaly papule left malar cheek erythematous and crusted. Multiple nevi. Stable in size, shape, color. Has not noticed any new or changing pigmented lesions    Stasis dermatitis right lower leg-previously had a secondary allergic contact dermatitis. Sometimes develops mild erythema or scale which resolves with Lidex cream.  Right lower leg-status post surgery and radiation age 14 for cancer. Review of Systems:  Constitutional: Reports general sense of well-being   Skin-no new or changing pigmented lesions. No new rashes    Past Medical History, Surgical History, Family History, Medications and Allergies reviewed. Social History:   Social History     Socioeconomic History    Marital status:      Spouse name: Stefany Yarbrough Number of children: 2    Years of education: Not on file    Highest education level: Not on file   Occupational History    Not on file   Social Needs    Financial resource strain: Not on file    Food insecurity:     Worry: Not on file     Inability: Not on file    Transportation needs:     Medical: Not on file     Non-medical: Not on file   Tobacco Use    Smoking status: Never Smoker    Smokeless tobacco: Never Used   Substance and Sexual Activity    Alcohol use:  Yes    Drug use: Not on file    Sexual activity: Not on file   Lifestyle    Physical activity:     Days per week: Not on file     Minutes per session: Not on file    Stress: Not on file   Relationships    Social connections:     Talks on phone: Not on file     Gets together: Not on file     Attends Adventism service: Not on file     Active member of club or organization: Not on file     Attends meetings of clubs or organizations: Not on file     Relationship status: Not on file    Intimate partner violence:     Fear of current or ex partner: Not on file     Emotionally abused: Not on file     Physically abused: Not on file     Forced sexual activity: Not on file   Other Topics Concern    Not on file   Social History Narrative    Not on file       Physical Examination       -General: Well-appearing, NAD  1. Normal affect. Total body skin exam including scalp, face, neck, chest, abdomen, back, bilateral upper extremities, bilateral lower extremities, ocular conjunctiva, external lips, and nails was performed. Examination normal unless stated below. Underwear area not examined. Scattered on the trunk and extremities are multiple well-defined round and oval symmetric smoothly-bordered uniformly brown macules and papules. Multiple gritty erythematous papules on his face including more hypertrophic papule left malar cheek, left temple, nasal bridge. Diffuse photo damage with freckling and lentigines-severe across his shoulders and upper back. Mild erythema and scale right lower leg. Assessment and Plan     1. Keratosis, actinic -due to large number of actinic keratoses and widespread surface area forehead, cheeks, nose-will benefit from field therapy. Will wait until fall is patient will be in the sun all summer. Will address field therapy in the fall-discussed Efudex with patient today    Left temple, left malar cheek, nasal bridge-4 lesion(s) treated w/ liquid nitrogen. Edu re: risk of blister formation, discomfort, scar, hypopigmentation. Discussed wound care.       2. Benign nevus - Benign acquired melanocytic nevi  -Recommend monthly self skin exams   -Educated regarding the ABCDEs of melanoma detection   -Call for any new/changing moles or concerning lesions  -Reviewed sun protective behavior -- sun avoidance during the peak hours of the day, sun-protective clothing (including hat and sunglasses), sunscreen use (water resistant, broad spectrum, SPF at least 30, need for reapplication every 2 to 3 hours), avoidance of tanning beds      3. Diffuse photodamage of skin -hats, sunscreen, sun avoidance    4. Venous stasis dermatitis of right lower extremity -Lidex cream b.i.d. up to 2 weeks as needed for flares.   Avoid Neosporin No

## 2022-09-26 ENCOUNTER — OFFICE VISIT (OUTPATIENT)
Dept: FAMILY MEDICINE CLINIC | Age: 62
End: 2022-09-26
Payer: COMMERCIAL

## 2022-09-26 VITALS
HEIGHT: 69 IN | WEIGHT: 199 LBS | HEART RATE: 91 BPM | SYSTOLIC BLOOD PRESSURE: 150 MMHG | OXYGEN SATURATION: 94 % | BODY MASS INDEX: 29.47 KG/M2 | DIASTOLIC BLOOD PRESSURE: 90 MMHG

## 2022-09-26 DIAGNOSIS — R91.8 MULTIPLE LUNG NODULES: ICD-10-CM

## 2022-09-26 DIAGNOSIS — I10 ESSENTIAL HYPERTENSION: ICD-10-CM

## 2022-09-26 DIAGNOSIS — C41.9 OSTEOSARCOMA (HCC): Primary | ICD-10-CM

## 2022-09-26 DIAGNOSIS — E79.0 HYPERURICEMIA: ICD-10-CM

## 2022-09-26 DIAGNOSIS — E78.2 MIXED HYPERLIPIDEMIA: ICD-10-CM

## 2022-09-26 DIAGNOSIS — I10 PRIMARY HYPERTENSION: ICD-10-CM

## 2022-09-26 DIAGNOSIS — Z89.511 HISTORY OF AMPUTATION BELOW KNEE, RIGHT (HCC): ICD-10-CM

## 2022-09-26 PROCEDURE — 99214 OFFICE O/P EST MOD 30 MIN: CPT | Performed by: FAMILY MEDICINE

## 2022-09-26 RX ORDER — LISINOPRIL AND HYDROCHLOROTHIAZIDE 20; 12.5 MG/1; MG/1
TABLET ORAL
Qty: 180 TABLET | Refills: 3 | Status: SHIPPED | OUTPATIENT
Start: 2022-09-26

## 2022-09-26 ASSESSMENT — PATIENT HEALTH QUESTIONNAIRE - PHQ9
2. FEELING DOWN, DEPRESSED OR HOPELESS: 0
SUM OF ALL RESPONSES TO PHQ QUESTIONS 1-9: 0
1. LITTLE INTEREST OR PLEASURE IN DOING THINGS: 0
SUM OF ALL RESPONSES TO PHQ QUESTIONS 1-9: 0
SUM OF ALL RESPONSES TO PHQ QUESTIONS 1-9: 0
SUM OF ALL RESPONSES TO PHQ9 QUESTIONS 1 & 2: 0
SUM OF ALL RESPONSES TO PHQ QUESTIONS 1-9: 0

## 2022-09-26 NOTE — PROGRESS NOTES
Muna Escobar is a 57 y.o. male. HPI:retired now  Drinking more beer lately  Wants to keep costs down, as he has insurance with high deductible  Does not want to follow his aneurysm as he is not interested in surgical approach  Meds, vitamins and allergies reviewed with pt    ROS: No TIA's or unusual headaches, no dysphagia. No prolonged cough. No dyspnea or chest pain on exertion. No abdominal pain, change in bowel habits, black or bloody stools. No urinary tract symptoms. No new or unusual musculoskeletal symptoms. Prior to Visit Medications    Medication Sig Taking? Authorizing Provider   metoprolol tartrate (LOPRESSOR) 25 MG tablet TAKE ONE TABLET BY MOUTH TWICE A DAY Yes Excell Osler, MD   lisinopril-hydroCHLOROthiazide (PRINZIDE;ZESTORETIC) 20-12.5 MG per tablet Pt takes 1.5 tabs daily Yes Excell Osler, MD   Cholecalciferol (VITAMIN D3 PO) Take 1 capsule by mouth daily Yes Historical Provider, MD   Multiple Vitamins-Minerals (CENTRUM SILVER PO) Take  by mouth. Yes Historical Provider, MD   Ascorbic Acid (VITAMIN C) 500 MG CHEW Take  by mouth. Yes Historical Provider, MD   aspirin 81 MG tablet Take 81 mg by mouth daily.  Yes Historical Provider, MD       Past Medical History:   Diagnosis Date    [facility]    Hyperlipidemia     Hyperlipidemia 12/18/2019    Hyperlipidemia 12/18/2019    Hypertension     Hyperuricemia 12/18/2019    Hyperuricemia 12/18/2019    MDRO (multiple drug resistant organisms) resistance     MRSA right leg    [facility] 12/18/2019       Social History     Tobacco Use    Smoking status: Never    Smokeless tobacco: Never   Vaping Use    Vaping Use: Never used   Substance Use Topics    Alcohol use: Yes     Comment: 2-8 drinks per week    Drug use: Not Currently       Family History   Problem Relation Age of Onset    Breast Cancer Mother     Hypertension Father     Diabetes Maternal Grandfather        Allergies   Allergen Reactions    Neosporin [Neomycin-Polymyxin-Gramicidin] Rash       OBJECTIVE:  BP (!) 162/90   Pulse 91   Ht 5' 9\" (1.753 m)   Wt 199 lb (90.3 kg)   SpO2 94%   BMI 29.39 kg/m²   GEN:  in NAD overweight, weight stable  NECK:  Supple without adenopathy. No bruits  CV:  Regular rate and rhythm, S1 and S2 normal, no murmurs, clicks  PULM:  Chest is clear, no wheezing ,  symmetric air entry throughout both lung fields. EXT: No rash or edema  NEURO: nonfocal    ASSESSMENT/PLAN:  1. Essential hypertension  Not at goal on lopressor 25 mg bid and lsinopril/hctz 20/12.5 1/5 tab po qd  Will increase lisinopril HCT 20/12.5 up to 2 tablets daily, continue Lopressor  Monitor blood pressure at home return to office in 4 to 6 weeks if blood pressures not controlled  BMP he has agreed to    2.  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

## 2022-09-27 LAB
ANION GAP SERPL CALCULATED.3IONS-SCNC: 14 MMOL/L (ref 3–16)
BUN BLDV-MCNC: 18 MG/DL (ref 7–20)
CALCIUM SERPL-MCNC: 10.8 MG/DL (ref 8.3–10.6)
CHLORIDE BLD-SCNC: 98 MMOL/L (ref 99–110)
CO2: 25 MMOL/L (ref 21–32)
CREAT SERPL-MCNC: 1.2 MG/DL (ref 0.8–1.3)
GFR AFRICAN AMERICAN: >60
GFR NON-AFRICAN AMERICAN: >60
GLUCOSE BLD-MCNC: 113 MG/DL (ref 70–99)
POTASSIUM SERPL-SCNC: 3.9 MMOL/L (ref 3.5–5.1)
SODIUM BLD-SCNC: 137 MMOL/L (ref 136–145)

## 2023-09-27 DIAGNOSIS — I10 ESSENTIAL HYPERTENSION: ICD-10-CM

## 2023-09-27 RX ORDER — LISINOPRIL AND HYDROCHLOROTHIAZIDE 20; 12.5 MG/1; MG/1
TABLET ORAL
Qty: 60 TABLET | Refills: 0 | Status: SHIPPED | OUTPATIENT
Start: 2023-09-27 | End: 2023-10-12 | Stop reason: SDUPTHER

## 2023-09-27 NOTE — TELEPHONE ENCOUNTER
Lov 9/26/22  Lrf 180 3 9/26/22  \180 3 9/26/22  Lab Results   Component Value Date     09/26/2022    K 3.9 09/26/2022    CL 98 (L) 09/26/2022    CO2 25 09/26/2022    BUN 18 09/26/2022    CREATININE 1.2 09/26/2022    GLUCOSE 113 (H) 09/26/2022    CALCIUM 10.8 (H) 09/26/2022    PROT 6.5 09/11/2020    LABALBU 4.7 09/11/2020    BILITOT 0.5 09/11/2020    ALKPHOS 71 09/11/2020    AST 20 09/11/2020    ALT 19 09/11/2020    LABGLOM >60 09/26/2022    GFRAA >60 09/26/2022    AGRATIO 2.6 (H) 09/11/2020    GLOB 1.8 09/11/2020

## 2023-10-12 ENCOUNTER — OFFICE VISIT (OUTPATIENT)
Dept: FAMILY MEDICINE CLINIC | Age: 63
End: 2023-10-12

## 2023-10-12 VITALS
DIASTOLIC BLOOD PRESSURE: 80 MMHG | WEIGHT: 202 LBS | BODY MASS INDEX: 29.83 KG/M2 | OXYGEN SATURATION: 98 % | HEART RATE: 90 BPM | SYSTOLIC BLOOD PRESSURE: 146 MMHG

## 2023-10-12 DIAGNOSIS — E78.2 MIXED HYPERLIPIDEMIA: ICD-10-CM

## 2023-10-12 DIAGNOSIS — I10 PRIMARY HYPERTENSION: ICD-10-CM

## 2023-10-12 DIAGNOSIS — Z12.5 PROSTATE CANCER SCREENING: ICD-10-CM

## 2023-10-12 DIAGNOSIS — Z89.511 HISTORY OF AMPUTATION BELOW KNEE, RIGHT (HCC): ICD-10-CM

## 2023-10-12 DIAGNOSIS — E79.0 HYPERURICEMIA: ICD-10-CM

## 2023-10-12 DIAGNOSIS — C41.9 OSTEOSARCOMA (HCC): Primary | ICD-10-CM

## 2023-10-12 DIAGNOSIS — I10 ESSENTIAL HYPERTENSION: ICD-10-CM

## 2023-10-12 DIAGNOSIS — I77.810 ASCENDING AORTA DILATATION (HCC): ICD-10-CM

## 2023-10-12 PROCEDURE — 3079F DIAST BP 80-89 MM HG: CPT | Performed by: FAMILY MEDICINE

## 2023-10-12 PROCEDURE — 3077F SYST BP >= 140 MM HG: CPT | Performed by: FAMILY MEDICINE

## 2023-10-12 PROCEDURE — 99214 OFFICE O/P EST MOD 30 MIN: CPT | Performed by: FAMILY MEDICINE

## 2023-10-12 RX ORDER — LISINOPRIL AND HYDROCHLOROTHIAZIDE 20; 12.5 MG/1; MG/1
TABLET ORAL
Qty: 180 TABLET | Refills: 3 | Status: SHIPPED | OUTPATIENT
Start: 2023-10-12

## 2023-10-12 SDOH — ECONOMIC STABILITY: FOOD INSECURITY: WITHIN THE PAST 12 MONTHS, THE FOOD YOU BOUGHT JUST DIDN'T LAST AND YOU DIDN'T HAVE MONEY TO GET MORE.: NEVER TRUE

## 2023-10-12 SDOH — ECONOMIC STABILITY: HOUSING INSECURITY
IN THE LAST 12 MONTHS, WAS THERE A TIME WHEN YOU DID NOT HAVE A STEADY PLACE TO SLEEP OR SLEPT IN A SHELTER (INCLUDING NOW)?: NO

## 2023-10-12 SDOH — ECONOMIC STABILITY: FOOD INSECURITY: WITHIN THE PAST 12 MONTHS, YOU WORRIED THAT YOUR FOOD WOULD RUN OUT BEFORE YOU GOT MONEY TO BUY MORE.: NEVER TRUE

## 2023-10-12 SDOH — ECONOMIC STABILITY: INCOME INSECURITY: HOW HARD IS IT FOR YOU TO PAY FOR THE VERY BASICS LIKE FOOD, HOUSING, MEDICAL CARE, AND HEATING?: NOT HARD AT ALL

## 2023-10-12 ASSESSMENT — PATIENT HEALTH QUESTIONNAIRE - PHQ9
SUM OF ALL RESPONSES TO PHQ QUESTIONS 1-9: 0
SUM OF ALL RESPONSES TO PHQ QUESTIONS 1-9: 0
2. FEELING DOWN, DEPRESSED OR HOPELESS: 0
1. LITTLE INTEREST OR PLEASURE IN DOING THINGS: 0
SUM OF ALL RESPONSES TO PHQ9 QUESTIONS 1 & 2: 0
SUM OF ALL RESPONSES TO PHQ QUESTIONS 1-9: 0
SUM OF ALL RESPONSES TO PHQ QUESTIONS 1-9: 0

## 2023-10-12 NOTE — PROGRESS NOTES
Cristela Gardner is a 58 y.o. male. HPI:here for complex medical visit  Retired 2 years ago  Has his own insurance which has a very high deductible so he would like to keep his medical cost to a minimum. Full set of labs colonoscopy and large expenses he would like to wait till he is on Medicare in 1 year  Meds, vitamins and allergies reviewed with pt    ROS: No TIA's or unusual headaches, no dysphagia. No prolonged cough. No dyspnea or chest pain on exertion. No abdominal pain, change in bowel habits, black or bloody stools. No urinary tract symptoms. No new or unusual musculoskeletal symptoms. Prior to Visit Medications    Medication Sig Taking? Authorizing Provider   lisinopril-hydroCHLOROthiazide (PRINZIDE;ZESTORETIC) 20-12.5 MG per tablet TAKE TWO TABLETS BY MOUTH EVERY MORNING Yes Homa Abarca MD   metoprolol tartrate (LOPRESSOR) 25 MG tablet TAKE ONE TABLET BY MOUTH TWICE A DAY Yes Homa Abarca MD   Cholecalciferol (VITAMIN D3 PO) Take 1 capsule by mouth daily Yes Courtney Herron MD   Multiple Vitamins-Minerals (CENTRUM SILVER PO) Take  by mouth. Yes Courtney Herron MD   Ascorbic Acid (VITAMIN C) 500 MG CHEW Take  by mouth.  Yes Courtney Herron MD   aspirin 81 MG tablet Take 1 tablet by mouth daily Yes Courtney Herron MD       Past Medical History:   Diagnosis Date    Cancer ([facility]) bone cancer 1973    Hyperlipidemia     Hyperlipidemia 12/18/2019    Hyperlipidemia 12/18/2019    Hypertension     Hyperuricemia 12/18/2019    Hyperuricemia 12/18/2019    MDRO (multiple drug resistant organisms) resistance     MRSA right leg    Osteosarcoma ([facility]) 12/18/2019       Social History     Tobacco Use    Smoking status: Never    Smokeless tobacco: Never   Vaping Use    Vaping Use: Never used   Substance Use Topics    Alcohol use: Yes     Comment: 2-8 drinks per week    Drug use: Not Currently       Family History   Problem Relation Age of Onset    Breast Cancer Mother

## 2024-09-30 ENCOUNTER — TELEPHONE (OUTPATIENT)
Dept: FAMILY MEDICINE CLINIC | Age: 64
End: 2024-09-30

## 2024-09-30 NOTE — TELEPHONE ENCOUNTER
Lvm for pt to call back. Pt sent in basket message of wanting to make appt with mahin in October for follow up

## 2024-10-01 DIAGNOSIS — I10 ESSENTIAL HYPERTENSION: ICD-10-CM

## 2024-10-01 RX ORDER — LISINOPRIL AND HYDROCHLOROTHIAZIDE 12.5; 2 MG/1; MG/1
TABLET ORAL
Qty: 180 TABLET | Refills: 0 | Status: SHIPPED | OUTPATIENT
Start: 2024-10-01 | End: 2024-10-04

## 2024-10-01 NOTE — TELEPHONE ENCOUNTER
Lov 10/12/23  Lrf 180 3 10/12/23Medication:   Requested Prescriptions     Pending Prescriptions Disp Refills    lisinopril-hydroCHLOROthiazide (PRINZIDE;ZESTORETIC) 20-12.5 MG per tablet 180 tablet 3     Sig: TAKE TWO TABLETS BY MOUTH EVERY MORNING       Last Filled:      Patient Phone Number: [phone] (home)     Last appt: 10/12/2023   Next appt: 10/23/2024    Lab Results   Component Value Date     09/26/2022    K 3.9 09/26/2022    CL 98 (L) 09/26/2022    CO2 25 09/26/2022    BUN 18 09/26/2022    CREATININE 1.2 09/26/2022    GLUCOSE 113 (H) 09/26/2022    CALCIUM 10.8 (H) 09/26/2022    BILITOT 0.5 09/11/2020    ALKPHOS 71 09/11/2020    AST 20 09/11/2020    ALT 19 09/11/2020    LABGLOM >60 09/26/2022    GFRAA >60 09/26/2022    AGRATIO 2.6 (H) 09/11/2020    GLOB 1.8 09/11/2020              yes

## 2024-10-01 NOTE — TELEPHONE ENCOUNTER
Medication and Quantity requested:     lisinopril-hydroCHLOROthiazide (PRINZIDE;ZESTORETIC) 20-12.5 MG per tablet [[id number]]        Last Visit  10/12/2023      Pharmacy and phone number updated in [facility]:  yes    [facility] [place]     Patient is scheduled for an office visit on 10/23/2024 but he will be out of his medication before his appointment

## 2024-10-04 DIAGNOSIS — I10 ESSENTIAL HYPERTENSION: ICD-10-CM

## 2024-10-04 RX ORDER — LISINOPRIL AND HYDROCHLOROTHIAZIDE 12.5; 2 MG/1; MG/1
TABLET ORAL
Qty: 180 TABLET | Refills: 0 | Status: SHIPPED | OUTPATIENT
Start: 2024-10-04

## 2024-10-04 RX ORDER — METOPROLOL TARTRATE 25 MG/1
TABLET, FILM COATED ORAL
Qty: 270 TABLET | Refills: 3 | Status: SHIPPED | OUTPATIENT
Start: 2024-10-04

## 2024-10-23 ENCOUNTER — OFFICE VISIT (OUTPATIENT)
Dept: FAMILY MEDICINE CLINIC | Age: 64
End: 2024-10-23

## 2024-10-23 VITALS
HEART RATE: 66 BPM | OXYGEN SATURATION: 97 % | BODY MASS INDEX: 28.8 KG/M2 | SYSTOLIC BLOOD PRESSURE: 122 MMHG | WEIGHT: 195 LBS | DIASTOLIC BLOOD PRESSURE: 72 MMHG

## 2024-10-23 DIAGNOSIS — C41.9 OSTEOSARCOMA (HCC): Primary | ICD-10-CM

## 2024-10-23 DIAGNOSIS — R53.83 FATIGUE, UNSPECIFIED TYPE: ICD-10-CM

## 2024-10-23 DIAGNOSIS — E79.0 HYPERURICEMIA: ICD-10-CM

## 2024-10-23 DIAGNOSIS — I10 ESSENTIAL HYPERTENSION: ICD-10-CM

## 2024-10-23 DIAGNOSIS — E78.2 MIXED HYPERLIPIDEMIA: ICD-10-CM

## 2024-10-23 DIAGNOSIS — I10 PRIMARY HYPERTENSION: ICD-10-CM

## 2024-10-23 DIAGNOSIS — Z12.5 PROSTATE CANCER SCREENING: ICD-10-CM

## 2024-10-23 LAB
BASOPHILS # BLD: 0 K/UL (ref 0–0.2)
BASOPHILS NFR BLD: 0.8 %
DEPRECATED RDW RBC AUTO: 13.7 % (ref 12.4–15.4)
EOSINOPHIL # BLD: 0.1 K/UL (ref 0–0.6)
EOSINOPHIL NFR BLD: 2.9 %
HCT VFR BLD AUTO: 43.9 % (ref 40.5–52.5)
HGB BLD-MCNC: 14.9 G/DL (ref 13.5–17.5)
LYMPHOCYTES # BLD: 1.3 K/UL (ref 1–5.1)
LYMPHOCYTES NFR BLD: 25.3 %
MCH RBC QN AUTO: 31.4 PG (ref 26–34)
MCHC RBC AUTO-ENTMCNC: 34 G/DL (ref 31–36)
MCV RBC AUTO: 92.3 FL (ref 80–100)
MONOCYTES # BLD: 0.4 K/UL (ref 0–1.3)
MONOCYTES NFR BLD: 8.9 %
NEUTROPHILS # BLD: 3.1 K/UL (ref 1.7–7.7)
NEUTROPHILS NFR BLD: 62.1 %
PLATELET # BLD AUTO: 252 K/UL (ref 135–450)
PMV BLD AUTO: 10 FL (ref 5–10.5)
RBC # BLD AUTO: 4.75 M/UL (ref 4.2–5.9)
WBC # BLD AUTO: 5 K/UL (ref 4–11)

## 2024-10-23 PROCEDURE — 99214 OFFICE O/P EST MOD 30 MIN: CPT | Performed by: FAMILY MEDICINE

## 2024-10-23 PROCEDURE — G2211 COMPLEX E/M VISIT ADD ON: HCPCS | Performed by: FAMILY MEDICINE

## 2024-10-23 PROCEDURE — 3078F DIAST BP <80 MM HG: CPT | Performed by: FAMILY MEDICINE

## 2024-10-23 PROCEDURE — 3074F SYST BP LT 130 MM HG: CPT | Performed by: FAMILY MEDICINE

## 2024-10-23 PROCEDURE — 1123F ACP DISCUSS/DSCN MKR DOCD: CPT | Performed by: FAMILY MEDICINE

## 2024-10-23 RX ORDER — LISINOPRIL AND HYDROCHLOROTHIAZIDE 12.5; 2 MG/1; MG/1
TABLET ORAL
Qty: 180 TABLET | Refills: 3 | Status: SHIPPED | OUTPATIENT
Start: 2024-10-23

## 2024-10-23 RX ORDER — METOPROLOL TARTRATE 25 MG/1
25 TABLET, FILM COATED ORAL 2 TIMES DAILY
Qty: 180 TABLET | Refills: 3 | Status: SHIPPED | OUTPATIENT
Start: 2024-10-23

## 2024-10-23 SDOH — ECONOMIC STABILITY: FOOD INSECURITY: WITHIN THE PAST 12 MONTHS, YOU WORRIED THAT YOUR FOOD WOULD RUN OUT BEFORE YOU GOT MONEY TO BUY MORE.: NEVER TRUE

## 2024-10-23 SDOH — ECONOMIC STABILITY: FOOD INSECURITY: WITHIN THE PAST 12 MONTHS, THE FOOD YOU BOUGHT JUST DIDN'T LAST AND YOU DIDN'T HAVE MONEY TO GET MORE.: NEVER TRUE

## 2024-10-23 SDOH — ECONOMIC STABILITY: INCOME INSECURITY: HOW HARD IS IT FOR YOU TO PAY FOR THE VERY BASICS LIKE FOOD, HOUSING, MEDICAL CARE, AND HEATING?: NOT HARD AT ALL

## 2024-10-23 ASSESSMENT — PATIENT HEALTH QUESTIONNAIRE - PHQ9
SUM OF ALL RESPONSES TO PHQ QUESTIONS 1-9: 0
SUM OF ALL RESPONSES TO PHQ9 QUESTIONS 1 & 2: 0
SUM OF ALL RESPONSES TO PHQ QUESTIONS 1-9: 0
1. LITTLE INTEREST OR PLEASURE IN DOING THINGS: NOT AT ALL
2. FEELING DOWN, DEPRESSED OR HOPELESS: NOT AT ALL
SUM OF ALL RESPONSES TO PHQ QUESTIONS 1-9: 0
SUM OF ALL RESPONSES TO PHQ QUESTIONS 1-9: 0

## 2024-10-23 NOTE — PROGRESS NOTES
Jesus Danielle is a 64 y.o. male.    HPI: Here for complex medical visit  Just started medicare  Meds, vitamins and allergies reviewed with pt    ROS: No TIA's or unusual headaches, no dysphagia.  No prolonged cough. No dyspnea or chest pain on exertion.  No abdominal pain, change in bowel habits, black or bloody stools.  No urinary tract symptoms.  No new or unusual musculoskeletal symptoms.       Prior to Visit Medications    Medication Sig Taking? Authorizing Provider   metoprolol tartrate (LOPRESSOR) 25 MG tablet TAKE 1 AND 1/2 TABLET BY MOUTH TWO TIMES A DAY Yes Dalton Hall MD   lisinopril-hydroCHLOROthiazide (PRINZIDE;ZESTORETIC) 20-12.5 MG per tablet TAKE 2 TABLETS BY MOUTH EVERY MORNING Yes Dalton Hall MD   Cholecalciferol (VITAMIN D3 PO) Take 1 capsule by mouth daily Yes Courtney Herron MD   Multiple Vitamins-Minerals (CENTRUM SILVER PO) Take  by mouth. Yes Courtney Herron MD   Ascorbic Acid (VITAMIN C) 500 MG CHEW Take  by mouth. Yes Courtney Herron MD   aspirin 81 MG tablet Take 1 tablet by mouth daily Yes Courtney Herron MD       Past Medical History:   Diagnosis Date    Ascending aorta dilatation ([facility]) 10/12/2023    Cancer ([facility]) bone cancer 1973    Hyperlipidemia     Hyperlipidemia 12/18/2019    Hyperlipidemia 12/18/2019    Hypertension     Hyperuricemia 12/18/2019    Hyperuricemia 12/18/2019    MDRO (multiple drug resistant organisms) resistance     MRSA right leg    Osteosarcoma ([facility]) 12/18/2019       Social History     Tobacco Use    Smoking status: Never    Smokeless tobacco: Never   Vaping Use    Vaping status: Never Used   Substance Use Topics    Alcohol use: Yes     Comment: 2-8 drinks per week    Drug use: Not Currently       Family History   Problem Relation Age of Onset    Breast Cancer Mother     Hypertension Father     Diabetes Maternal Grandfather        Allergies   Allergen Reactions    Neosporin [Neomycin-Polymyxin-Gramicidin] Rash

## 2024-10-24 DIAGNOSIS — E83.52 HYPERCALCEMIA: Primary | ICD-10-CM

## 2024-10-24 DIAGNOSIS — E83.52 HYPERCALCEMIA: ICD-10-CM

## 2024-10-24 LAB
ALBUMIN SERPL-MCNC: 4.9 G/DL (ref 3.4–5)
ALBUMIN/GLOB SERPL: 2.5 {RATIO} (ref 1.1–2.2)
ALP SERPL-CCNC: 69 U/L (ref 40–129)
ALT SERPL-CCNC: 32 U/L (ref 10–40)
ANION GAP SERPL CALCULATED.3IONS-SCNC: 14 MMOL/L (ref 3–16)
AST SERPL-CCNC: 28 U/L (ref 15–37)
BILIRUB SERPL-MCNC: 0.7 MG/DL (ref 0–1)
BUN SERPL-MCNC: 17 MG/DL (ref 7–20)
CALCIUM SERPL-MCNC: 11.2 MG/DL (ref 8.3–10.6)
CHLORIDE SERPL-SCNC: 100 MMOL/L (ref 99–110)
CHOLEST SERPL-MCNC: 241 MG/DL (ref 0–199)
CO2 SERPL-SCNC: 24 MMOL/L (ref 21–32)
CREAT SERPL-MCNC: 1 MG/DL (ref 0.8–1.3)
GFR SERPLBLD CREATININE-BSD FMLA CKD-EPI: 83 ML/MIN/{1.73_M2}
GLUCOSE P FAST SERPL-MCNC: 84 MG/DL (ref 70–99)
HDLC SERPL-MCNC: 43 MG/DL (ref 40–60)
LDLC SERPL CALC-MCNC: 166 MG/DL
POTASSIUM SERPL-SCNC: 4.1 MMOL/L (ref 3.5–5.1)
PROT SERPL-MCNC: 6.9 G/DL (ref 6.4–8.2)
PSA SERPL DL<=0.01 NG/ML-MCNC: 2.63 NG/ML (ref 0–4)
PTH-INTACT SERPL-MCNC: 75.3 PG/ML (ref 14–72)
SODIUM SERPL-SCNC: 138 MMOL/L (ref 136–145)
TRIGL SERPL-MCNC: 159 MG/DL (ref 0–150)
TSH SERPL DL<=0.005 MIU/L-ACNC: 1.6 UIU/ML (ref 0.27–4.2)
URATE SERPL-MCNC: 8.9 MG/DL (ref 3.5–7.2)
VLDLC SERPL CALC-MCNC: 32 MG/DL

## 2024-10-24 RX ORDER — ATORVASTATIN CALCIUM 20 MG/1
20 TABLET, FILM COATED ORAL DAILY
Qty: 30 TABLET | Refills: 5 | Status: SHIPPED | OUTPATIENT
Start: 2024-10-24

## (undated) DEVICE — SUTURE ETHLN SZ 3-0 L30IN NONABSORBABLE BLK FSL L30MM 3/8 1671H

## (undated) DEVICE — STERILE LATEX POWDER-FREE SURGICAL GLOVESWITH NITRILE COATING: Brand: PROTEXIS

## (undated) DEVICE — PAD,ABDOMINAL,8"X10",ST,LF: Brand: MEDLINE

## (undated) DEVICE — COVER LT HNDL PLAS RIG 2 PER PK

## (undated) DEVICE — Z CONVERTED USE 2273232 BANDAGE COMPR W6INXL11YD E KNIT DBL SELF CLSR EZE-BAND

## (undated) DEVICE — SUTURE PERMA-HAND 0 L18IN NONABSORBABLE BLK SILK BRAID W/O SA66G

## (undated) DEVICE — COVER,MAYO STAND,STERILE: Brand: MEDLINE

## (undated) DEVICE — SPONGE LAP W18XL18IN WHT COT 4 PLY FLD STRUNG RADPQ DISP ST

## (undated) DEVICE — BLADE SURG NO10 S STL STR DISP GLASSVAN

## (undated) DEVICE — SUTURE MCRYL SZ 2-0 L18IN ABSRB VLT L36MM CT-1 1/2 CIR Y739D

## (undated) DEVICE — GLOVE ORANGE PI 8   MSG9080

## (undated) DEVICE — GAUZE,SPONGE,4"X4",8PLY,STRL,LF,10/TRAY: Brand: MEDLINE

## (undated) DEVICE — SMARTGOWN BREATHABLE SURGICAL GOWN: Brand: CONVERTORS

## (undated) DEVICE — OCCLUSIVE GAUZE STRIP,3% BISMUTH TRIBROMOPHENATE IN PETROLATUM BLEND: Brand: XEROFORM

## (undated) DEVICE — DRESSING,GAUZE,XEROFORM,CURAD,5"X9",ST: Brand: CURAD

## (undated) DEVICE — PENCIL SMK EVAC ALL IN 1 DSGN ENH VISIBILITY IMPROVED AIR

## (undated) DEVICE — PADDING CAST W4INXL4YD ST COT RAYON MICROPLEATED HIGHLY

## (undated) DEVICE — SOLUTION IV IRRIG WATER 1000ML POUR BRL 2F7114

## (undated) DEVICE — SUTURE MCRYL SZ 0 L18IN ABSRB VLT L36MM CT-1 1/2 CIR Y740D

## (undated) DEVICE — PADDING CAST W6INXL4YD ST COT RAYON MICROPLEATED HIGHLY

## (undated) DEVICE — SMARTGOWN SURGICAL GOWN, XL: Brand: CONVERTORS

## (undated) DEVICE — BANDAGE ROLL,100% COTTON, 8 PLY, LARGE: Brand: KERLIX

## (undated) DEVICE — DUAL CUT SAGITTAL BLADE

## (undated) DEVICE — SOLUTION IV IRRIG 500ML 0.9% SODIUM CHL 2F7123

## (undated) DEVICE — PACK PROCEDURE SURG EXTREMITY SORGER CDS

## (undated) DEVICE — GLOVE ORANGE PI 7 1/2   MSG9075

## (undated) DEVICE — SUTURE PERMAHAND SZ 0 L30IN NONABSORBABLE BLK L26MM SH 1/2 K834H

## (undated) DEVICE — DRESSING FOAM W6.3XL7.9IN TAN SACR SELF ADH MEPILEX BORD